# Patient Record
Sex: MALE | Race: WHITE | NOT HISPANIC OR LATINO | Employment: FULL TIME | URBAN - METROPOLITAN AREA
[De-identification: names, ages, dates, MRNs, and addresses within clinical notes are randomized per-mention and may not be internally consistent; named-entity substitution may affect disease eponyms.]

---

## 2024-02-18 DIAGNOSIS — E78.5 HYPERLIPIDEMIA, UNSPECIFIED HYPERLIPIDEMIA TYPE: Primary | ICD-10-CM

## 2024-02-18 RX ORDER — MINERAL OIL
180 ENEMA (ML) RECTAL
COMMUNITY

## 2024-02-18 RX ORDER — EPINEPHRINE 0.3 MG/.3ML
INJECTION SUBCUTANEOUS
COMMUNITY
Start: 2020-07-10

## 2024-02-18 RX ORDER — ROSUVASTATIN CALCIUM 20 MG/1
20 TABLET, COATED ORAL DAILY
Qty: 90 TABLET | Refills: 0 | Status: SHIPPED | OUTPATIENT
Start: 2024-02-18 | End: 2024-05-18

## 2024-02-18 RX ORDER — LORAZEPAM 1 MG/1
TABLET ORAL
COMMUNITY

## 2024-02-18 RX ORDER — OMEPRAZOLE 40 MG/1
40 CAPSULE, DELAYED RELEASE ORAL
COMMUNITY
Start: 2018-06-29

## 2024-02-18 RX ORDER — LOSARTAN POTASSIUM 50 MG/1
50 TABLET ORAL
COMMUNITY
Start: 2023-05-24

## 2024-02-18 RX ORDER — ESCITALOPRAM OXALATE 5 MG/1
5 TABLET ORAL
COMMUNITY
Start: 2023-05-24

## 2024-02-18 RX ORDER — ROSUVASTATIN CALCIUM 20 MG/1
1 TABLET, COATED ORAL NIGHTLY
COMMUNITY
Start: 2023-05-24 | End: 2024-02-18 | Stop reason: SDUPTHER

## 2024-02-18 RX ORDER — HYDROCHLOROTHIAZIDE 25 MG/1
25 TABLET ORAL
COMMUNITY
Start: 2023-05-24

## 2024-08-05 ENCOUNTER — OFFICE VISIT (OUTPATIENT)
Dept: ORTHOPEDIC SURGERY | Facility: HOSPITAL | Age: 54
End: 2024-08-05
Payer: COMMERCIAL

## 2024-08-05 ENCOUNTER — HOSPITAL ENCOUNTER (OUTPATIENT)
Dept: RADIOLOGY | Facility: HOSPITAL | Age: 54
Discharge: HOME | End: 2024-08-05
Payer: COMMERCIAL

## 2024-08-05 DIAGNOSIS — M47.812 CERVICAL SPONDYLOSIS: ICD-10-CM

## 2024-08-05 PROBLEM — Z98.890 HISTORY OF CERVICAL SPINAL SURGERY: Status: ACTIVE | Noted: 2024-08-05

## 2024-08-05 PROBLEM — R55 VASOVAGAL SYNCOPE: Status: ACTIVE | Noted: 2018-04-10

## 2024-08-05 PROBLEM — D22.9 NUMEROUS MOLES: Status: ACTIVE | Noted: 2018-08-08

## 2024-08-05 PROBLEM — T78.40XA ALLERGY: Status: ACTIVE | Noted: 2024-08-05

## 2024-08-05 PROBLEM — E78.5 HYPERLIPIDEMIA: Status: ACTIVE | Noted: 2024-08-05

## 2024-08-05 PROBLEM — M20.019 MALLET FINGER: Status: ACTIVE | Noted: 2024-08-05

## 2024-08-05 PROBLEM — T78.2XXA ANAPHYLAXIS: Status: ACTIVE | Noted: 2020-07-22

## 2024-08-05 PROBLEM — K44.9 HIATAL HERNIA: Status: ACTIVE | Noted: 2018-02-19

## 2024-08-05 PROBLEM — R97.20 ELEVATED PSA: Status: ACTIVE | Noted: 2023-05-24

## 2024-08-05 PROBLEM — F41.9 ANXIETY DISORDER: Status: ACTIVE | Noted: 2024-08-05

## 2024-08-05 PROBLEM — E66.3 OVERWEIGHT: Status: ACTIVE | Noted: 2024-08-05

## 2024-08-05 PROBLEM — R73.01 IMPAIRED FASTING GLUCOSE: Status: ACTIVE | Noted: 2018-02-19

## 2024-08-05 PROBLEM — C61 PROSTATE CANCER (MULTI): Status: ACTIVE | Noted: 2023-06-16

## 2024-08-05 PROBLEM — D12.6 ADENOMATOUS POLYP OF COLON: Status: ACTIVE | Noted: 2018-02-19

## 2024-08-05 PROBLEM — I10 ESSENTIAL HYPERTENSION: Status: ACTIVE | Noted: 2018-02-13

## 2024-08-05 PROBLEM — R20.0 LEFT LEG NUMBNESS: Status: ACTIVE | Noted: 2024-08-05

## 2024-08-05 PROBLEM — H61.20 CERUMEN IMPACTION: Status: ACTIVE | Noted: 2024-08-05

## 2024-08-05 PROBLEM — K22.2 ESOPHAGEAL STRICTURE: Status: ACTIVE | Noted: 2024-08-05

## 2024-08-05 PROBLEM — K21.9 GERD WITHOUT ESOPHAGITIS: Status: ACTIVE | Noted: 2018-02-19

## 2024-08-05 PROBLEM — R13.10 DYSPHAGIA: Status: ACTIVE | Noted: 2024-08-05

## 2024-08-05 PROCEDURE — 72040 X-RAY EXAM NECK SPINE 2-3 VW: CPT

## 2024-08-05 PROCEDURE — 72040 X-RAY EXAM NECK SPINE 2-3 VW: CPT | Performed by: STUDENT IN AN ORGANIZED HEALTH CARE EDUCATION/TRAINING PROGRAM

## 2024-08-05 PROCEDURE — 99204 OFFICE O/P NEW MOD 45 MIN: CPT | Performed by: ORTHOPAEDIC SURGERY

## 2024-08-05 PROCEDURE — 99214 OFFICE O/P EST MOD 30 MIN: CPT | Performed by: ORTHOPAEDIC SURGERY

## 2024-08-05 RX ORDER — GLUCOSAM/CHONDRO/HERB 149/HYAL 750-100 MG
1000 TABLET ORAL
COMMUNITY

## 2024-08-05 RX ORDER — LEVOFLOXACIN 500 MG/1
1 TABLET, FILM COATED ORAL
COMMUNITY
Start: 2024-01-03

## 2024-08-05 NOTE — PROGRESS NOTES
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  13 years ago, he had a two-level anterior cervical discectomy for cervical myelopathy.  He did very well with this over time.    3 days ago, while beginning school but diving training, he developed the abrupt onset of axial neck pain and paresthesias in both arms and legs.  He was swimming in a pool and thought that the symptoms might be muscular in nature.  They persisted when he got out of the pool.    When he extends his head and neck he experiences burning and numbness in all 4 extremities.    When he Valsalva to spit, 2 days ago this prompted a very similar episode of 4 extremity burning and paresthesias.    In April of this year, he fell over a student and landed on the floor and developed transient numbness in his left arm.    Aside from that, he has not had significant issues recently with his cervical spine.    He did have a cervical MRI in 2016 ordered by Dr. Dinesh Barbosa in neurology.  He does recall having some recurrent paresthesias in his extremities at that time but they did resolve.    He has had some chronic intermittent low back pain over the last several months.    His general health is significant for prostate cancer that was diagnosed incidentally with an elevated PSA level.  Surveillance was the plan and actually the PSA level has diminished.    Family, social, and medical histories are obtained and reviewed.    30-point, patient-recorded Review of Systems is personally obtained and reviewed. Inclusive is no history of weight loss, change in appetite, recent change in activity level, change in bowel or bladder habits, fevers, chills, malaise, or night pain.    On exam very pleasant, healthy-appearing man no acute distress.  He does have a somewhat wide-based gait and has difficulty with heel-to-toe tandem walking.  He has limited extension of the cervical spine and this does seem to reproduce symptoms in his  arms.    His strength is intact in both upper extremities.  He has positive Martín's bilaterally.  He has a positive inverted radial reflex bilaterally.    His strength is intact in the lower extremities.  He has very brisk reflexes at the knee and ankle bilaterally.    Plain film today shows his fusion is quite solid.  The adjacent disc space at C4-5 is well-maintained although there are large anterior osteophytes bridging this disc space, compared to plain films 13 years ago.    Impression: He has recently developed paresthesias in all 4 extremities and is exhibiting myelopathic features objectively.  He is ambulatory.    Given the severity of his symptoms, advanced imaging with an MRI with metal artifact reduction is indicated.    For the benefit of a representative of his insurer reviewing this request for advanced imaging, the indications are severe for extremity numbness and paresthesias, aggravated by extension of the cervical spine in an individual with a prior two-level cervical fusion, and an individual who is markedly hyperreflexic and has an abnormal gait.    I will speak with him after his cervical MRI is done.    ** Dictated with voice recognition software and not immediately reviewed for errors in grammar and/or spelling **

## 2024-08-05 NOTE — LETTER
August 5, 2024     Raymond Villanueva MD  [address] [facility] [address]    Patient: Franki Peterson   YOB: 1970   Date of Visit: 8/5/2024       Dear Dr. Raymond Villanueva MD:    Thank you for referring Franki Peterson to me for evaluation. Below are my notes for this consultation.  If you have questions, please do not hesitate to call me. I look forward to following your patient along with you.       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  13 years ago, he had a two-level anterior cervical discectomy for cervical myelopathy.  He did very well with this over time.    3 days ago, while beginning school but diving training, he developed the abrupt onset of axial neck pain and paresthesias in both arms and legs.  He was swimming in a pool and thought that the symptoms might be muscular in nature.  They persisted when he got out of the pool.    When he extends his head and neck he experiences burning and numbness in all 4 extremities.    When he Valsalva to spit, 2 days ago this prompted a very similar episode of 4 extremity burning and paresthesias.    In April of this year, he fell over a student and landed on the floor and developed transient numbness in his left arm.    Aside from that, he has not had significant issues recently with his cervical spine.    He did have a cervical MRI in 2016 ordered by Dr. Dinesh Barbosa in neurology.  He does recall having some recurrent paresthesias in his extremities at that time but they did resolve.    He has had some chronic intermittent low back pain over the last several months.    His general health is significant for prostate cancer that was diagnosed incidentally with an elevated PSA level.  Surveillance was the plan and actually the PSA level has  diminished.    Family, social, and medical histories are obtained and reviewed.    30-point, patient-recorded Review of Systems is personally obtained and reviewed. Inclusive is no history of weight loss, change in appetite, recent change in activity level, change in bowel or bladder habits, fevers, chills, malaise, or night pain.    On exam very pleasant, healthy-appearing man no acute distress.  He does have a somewhat wide-based gait and has difficulty with heel-to-toe tandem walking.  He has limited extension of the cervical spine and this does seem to reproduce symptoms in his arms.    His strength is intact in both upper extremities.  He has positive Martín's bilaterally.  He has a positive inverted radial reflex bilaterally.    His strength is intact in the lower extremities.  He has very brisk reflexes at the knee and ankle bilaterally.    Plain film today shows his fusion is quite solid.  The adjacent disc space at C4-5 is well-maintained although there are large anterior osteophytes bridging this disc space, compared to plain films 13 years ago.    Impression: He has recently developed paresthesias in all 4 extremities and is exhibiting myelopathic features objectively.  He is ambulatory.    Given the severity of his symptoms, advanced imaging with an MRI with metal artifact reduction is indicated.    For the benefit of a representative of his insurer reviewing this request for advanced imaging, the indications are severe for extremity numbness and paresthesias, aggravated by extension of the cervical spine in an individual with a prior two-level cervical fusion, and an individual who is markedly hyperreflexic and has an abnormal gait.    I will speak with him after his cervical MRI is done.    ** Dictated with voice recognition software and not immediately reviewed for errors in grammar and/or spelling **

## 2024-08-06 ENCOUNTER — HOSPITAL ENCOUNTER (OUTPATIENT)
Dept: RADIOLOGY | Facility: CLINIC | Age: 54
Discharge: HOME | End: 2024-08-06
Payer: COMMERCIAL

## 2024-08-06 DIAGNOSIS — M47.12 OTHER SPONDYLOSIS WITH MYELOPATHY, CERVICAL REGION: ICD-10-CM

## 2024-08-06 PROCEDURE — 72141 MRI NECK SPINE W/O DYE: CPT

## 2024-08-06 PROCEDURE — 72141 MRI NECK SPINE W/O DYE: CPT | Performed by: RADIOLOGY

## 2024-08-07 ENCOUNTER — OFFICE VISIT (OUTPATIENT)
Dept: ORTHOPEDIC SURGERY | Facility: CLINIC | Age: 54
End: 2024-08-07
Payer: COMMERCIAL

## 2024-08-07 DIAGNOSIS — M47.12 CERVICAL SPONDYLOSIS WITH MYELOPATHY: Primary | ICD-10-CM

## 2024-08-07 PROCEDURE — 99214 OFFICE O/P EST MOD 30 MIN: CPT | Performed by: ORTHOPAEDIC SURGERY

## 2024-08-07 ASSESSMENT — PAIN - FUNCTIONAL ASSESSMENT: PAIN_FUNCTIONAL_ASSESSMENT: NO/DENIES PAIN

## 2024-08-08 ENCOUNTER — LAB (OUTPATIENT)
Dept: LAB | Facility: LAB | Age: 54
End: 2024-08-08
Payer: COMMERCIAL

## 2024-08-08 DIAGNOSIS — M47.12 OTHER SPONDYLOSIS WITH MYELOPATHY, CERVICAL REGION: ICD-10-CM

## 2024-08-08 LAB
ABO GROUP (TYPE) IN BLOOD: NORMAL
ALBUMIN SERPL BCP-MCNC: 4.9 G/DL (ref 3.4–5)
ALP SERPL-CCNC: 60 U/L (ref 33–120)
ALT SERPL W P-5'-P-CCNC: 23 U/L (ref 10–52)
ANION GAP SERPL CALC-SCNC: 11 MMOL/L (ref 10–20)
ANTIBODY SCREEN: NORMAL
APPEARANCE UR: ABNORMAL
AST SERPL W P-5'-P-CCNC: 16 U/L (ref 9–39)
BILIRUB SERPL-MCNC: 0.9 MG/DL (ref 0–1.2)
BILIRUB UR STRIP.AUTO-MCNC: NEGATIVE MG/DL
BUN SERPL-MCNC: 22 MG/DL (ref 6–23)
CALCIUM SERPL-MCNC: 10.2 MG/DL (ref 8.6–10.6)
CHLORIDE SERPL-SCNC: 101 MMOL/L (ref 98–107)
CO2 SERPL-SCNC: 32 MMOL/L (ref 21–32)
COLOR UR: YELLOW
CREAT SERPL-MCNC: 0.98 MG/DL (ref 0.5–1.3)
EGFRCR SERPLBLD CKD-EPI 2021: >90 ML/MIN/1.73M*2
GLUCOSE SERPL-MCNC: 102 MG/DL (ref 74–99)
GLUCOSE UR STRIP.AUTO-MCNC: NORMAL MG/DL
INR PPP: 1 (ref 0.9–1.1)
KETONES UR STRIP.AUTO-MCNC: NEGATIVE MG/DL
LEUKOCYTE ESTERASE UR QL STRIP.AUTO: NEGATIVE
MUCOUS THREADS #/AREA URNS AUTO: ABNORMAL /LPF
NITRITE UR QL STRIP.AUTO: NEGATIVE
PH UR STRIP.AUTO: 5.5 [PH]
POTASSIUM SERPL-SCNC: 4.2 MMOL/L (ref 3.5–5.3)
PROT SERPL-MCNC: 7.7 G/DL (ref 6.4–8.2)
PROT UR STRIP.AUTO-MCNC: ABNORMAL MG/DL
PROTHROMBIN TIME: 11.3 SECONDS (ref 9.8–12.8)
RBC # UR STRIP.AUTO: ABNORMAL /UL
RBC #/AREA URNS AUTO: >20 /HPF
RH FACTOR (ANTIGEN D): NORMAL
SODIUM SERPL-SCNC: 140 MMOL/L (ref 136–145)
SP GR UR STRIP.AUTO: 1.03
UROBILINOGEN UR STRIP.AUTO-MCNC: NORMAL MG/DL
WBC #/AREA URNS AUTO: ABNORMAL /HPF

## 2024-08-08 PROCEDURE — 86850 RBC ANTIBODY SCREEN: CPT

## 2024-08-08 PROCEDURE — 86901 BLOOD TYPING SEROLOGIC RH(D): CPT

## 2024-08-08 PROCEDURE — 85610 PROTHROMBIN TIME: CPT

## 2024-08-08 PROCEDURE — 86900 BLOOD TYPING SEROLOGIC ABO: CPT

## 2024-08-08 PROCEDURE — 80053 COMPREHEN METABOLIC PANEL: CPT

## 2024-08-08 PROCEDURE — 81001 URINALYSIS AUTO W/SCOPE: CPT

## 2024-08-08 PROCEDURE — 36415 COLL VENOUS BLD VENIPUNCTURE: CPT

## 2024-08-08 NOTE — PROGRESS NOTES
Franki and his wife,  Pat Peterson, return to review his MRI.  I had spoken with him yesterday after his MRI was obtained and reviewed the findings and suggested that they come and today.    As noted, this past weekend, while involved in scuba diving training, he developed spontaneous onset of profound numbness and weakness in both arms and legs.  He was able to get out of the pool.  He thought it might have been muscular in nature.    The symptoms have persisted.  He is ambulatory.  However he notes diffuse numbness in both arms and legs.  When he extends his head he gets electric shocks in both arms.    He has noted some weakness and coordination difficulty in both arms and hands.    On exam today, his gait is somewhat wide-based.  He has great difficulty with heel-to-toe tandem walking.  He has limited extension of the cervical spine with a positive Lhermitte's.  His strength is intact in the upper and lower extremities.  He has markedly positive Martín's bilaterally.  He has brisk reflexes bilaterally at the knee and ankle.    Flexion-extension views obtained at his office visit earlier this week show no excessive motion.  There is a large anterior osteophyte bridging C4-5.    His MRI shows critically severe stenosis and severe spinal cord compression at C3-4 in large part due to a massive disc herniation.    Impression: He has become acutely myelopathic due to severe spinal cord compression at C3-4.  This is 2 levels above his prior multilevel anterior fusion done remotely.    Given the severity of the spinal cord compression along with his severe subjective and objective features clinically, surgery is indicated.    We have discussed the surgical options.  We have discussed the possibility of a anterior procedure with discectomy and fusion at C3-4 and C4-5.  There would be the significant possibility of severe dysphagia associated with this.    As such I would recommend a surgical approach posterior with  a laminectomy C3-4 and fusion from C3 extending into his fusion mass at C5-6.    We have talked about the risks and benefits and expectations of surgery.    Both he and his wife understand and agree.    I would recommend that this surgery is done urgently.  For the benefit of an insurer who is reviewing the need for surgery and the need for an expedited approach, this is based on the severity of his spinal cord compression and the severity of his symptoms clinically as noted above.    ** Dictated with voice recognition software and not immediately reviewed for errors in grammar and/or spelling **

## 2024-08-09 ENCOUNTER — LAB REQUISITION (OUTPATIENT)
Dept: LAB | Facility: HOSPITAL | Age: 54
End: 2024-08-09
Payer: COMMERCIAL

## 2024-08-09 ENCOUNTER — ANESTHESIA EVENT (OUTPATIENT)
Dept: OPERATING ROOM | Facility: HOSPITAL | Age: 54
End: 2024-08-09
Payer: COMMERCIAL

## 2024-08-09 DIAGNOSIS — M47.12 OTHER SPONDYLOSIS WITH MYELOPATHY, CERVICAL REGION: ICD-10-CM

## 2024-08-09 LAB
ABO GROUP (TYPE) IN BLOOD: NORMAL
ANTIBODY SCREEN: NORMAL
RH FACTOR (ANTIGEN D): NORMAL

## 2024-08-12 ENCOUNTER — ANESTHESIA (OUTPATIENT)
Dept: OPERATING ROOM | Facility: HOSPITAL | Age: 54
End: 2024-08-12
Payer: COMMERCIAL

## 2024-08-12 ENCOUNTER — HOSPITAL ENCOUNTER (OUTPATIENT)
Dept: NEUROLOGY | Facility: HOSPITAL | Age: 54
Setting detail: OUTPATIENT SURGERY
Discharge: HOME | End: 2024-08-12
Payer: COMMERCIAL

## 2024-08-12 ENCOUNTER — HOSPITAL ENCOUNTER (INPATIENT)
Facility: HOSPITAL | Age: 54
LOS: 2 days | Discharge: HOME | End: 2024-08-14
Attending: ORTHOPAEDIC SURGERY | Admitting: ORTHOPAEDIC SURGERY
Payer: COMMERCIAL

## 2024-08-12 ENCOUNTER — APPOINTMENT (OUTPATIENT)
Dept: RADIOLOGY | Facility: HOSPITAL | Age: 54
End: 2024-08-12
Payer: COMMERCIAL

## 2024-08-12 DIAGNOSIS — M47.812 CERVICAL SPONDYLOSIS: ICD-10-CM

## 2024-08-12 DIAGNOSIS — M47.12 OTHER SPONDYLOSIS WITH MYELOPATHY, CERVICAL REGION: Primary | ICD-10-CM

## 2024-08-12 PROBLEM — E66.9 OBESITY: Status: ACTIVE | Noted: 2024-08-12

## 2024-08-12 PROCEDURE — 2780000003 HC OR 278 NO HCPCS: Performed by: ORTHOPAEDIC SURGERY

## 2024-08-12 PROCEDURE — A63048 PR LAMINEC/FACETECT/FORAMIN,EACH ADDNL: Performed by: ANESTHESIOLOGY

## 2024-08-12 PROCEDURE — A63048 PR LAMINEC/FACETECT/FORAMIN,EACH ADDNL

## 2024-08-12 PROCEDURE — 95940 IONM IN OPERATNG ROOM 15 MIN: CPT

## 2024-08-12 PROCEDURE — 2500000001 HC RX 250 WO HCPCS SELF ADMINISTERED DRUGS (ALT 637 FOR MEDICARE OP): Performed by: ANESTHESIOLOGY

## 2024-08-12 PROCEDURE — 7100000011 HC EXTENDED STAY RECOVERY HOURLY - NURSING UNIT

## 2024-08-12 PROCEDURE — 4A11X4G MONITORING OF PERIPHERAL NERVOUS ELECTRICAL ACTIVITY, INTRAOPERATIVE, EXTERNAL APPROACH: ICD-10-PCS | Performed by: ORTHOPAEDIC SURGERY

## 2024-08-12 PROCEDURE — 76000 FLUOROSCOPY <1 HR PHYS/QHP: CPT

## 2024-08-12 PROCEDURE — 1210000001 HC SEMI-PRIVATE ROOM DAILY

## 2024-08-12 PROCEDURE — 0RG20K1 FUSION OF 2 OR MORE CERVICAL VERTEBRAL JOINTS WITH NONAUTOLOGOUS TISSUE SUBSTITUTE, POSTERIOR APPROACH, POSTERIOR COLUMN, OPEN APPROACH: ICD-10-PCS | Performed by: ORTHOPAEDIC SURGERY

## 2024-08-12 PROCEDURE — 2500000005 HC RX 250 GENERAL PHARMACY W/O HCPCS

## 2024-08-12 PROCEDURE — 3600000017 HC OR TIME - EACH INCREMENTAL 1 MINUTE - PROCEDURE LEVEL SIX: Performed by: ORTHOPAEDIC SURGERY

## 2024-08-12 PROCEDURE — 3700000002 HC GENERAL ANESTHESIA TIME - EACH INCREMENTAL 1 MINUTE: Performed by: ORTHOPAEDIC SURGERY

## 2024-08-12 PROCEDURE — C1713 ANCHOR/SCREW BN/BN,TIS/BN: HCPCS | Performed by: ORTHOPAEDIC SURGERY

## 2024-08-12 PROCEDURE — 7100000001 HC RECOVERY ROOM TIME - INITIAL BASE CHARGE: Performed by: ORTHOPAEDIC SURGERY

## 2024-08-12 PROCEDURE — 2500000005 HC RX 250 GENERAL PHARMACY W/O HCPCS: Performed by: ANESTHESIOLOGY

## 2024-08-12 PROCEDURE — 2500000004 HC RX 250 GENERAL PHARMACY W/ HCPCS (ALT 636 FOR OP/ED): Mod: JZ | Performed by: ORTHOPAEDIC SURGERY

## 2024-08-12 PROCEDURE — C1762 CONN TISS, HUMAN(INC FASCIA): HCPCS | Performed by: ORTHOPAEDIC SURGERY

## 2024-08-12 PROCEDURE — 63045 LAM FACETEC & FORAMOT CRV: CPT | Performed by: ORTHOPAEDIC SURGERY

## 2024-08-12 PROCEDURE — 2500000001 HC RX 250 WO HCPCS SELF ADMINISTERED DRUGS (ALT 637 FOR MEDICARE OP): Performed by: ORTHOPAEDIC SURGERY

## 2024-08-12 PROCEDURE — 7100000002 HC RECOVERY ROOM TIME - EACH INCREMENTAL 1 MINUTE: Performed by: ORTHOPAEDIC SURGERY

## 2024-08-12 PROCEDURE — 3700000001 HC GENERAL ANESTHESIA TIME - INITIAL BASE CHARGE: Performed by: ORTHOPAEDIC SURGERY

## 2024-08-12 PROCEDURE — 3600000018 HC OR TIME - INITIAL BASE CHARGE - PROCEDURE LEVEL SIX: Performed by: ORTHOPAEDIC SURGERY

## 2024-08-12 PROCEDURE — 2500000004 HC RX 250 GENERAL PHARMACY W/ HCPCS (ALT 636 FOR OP/ED)

## 2024-08-12 PROCEDURE — 9420000001 HC RT PATIENT EDUCATION 5 MIN

## 2024-08-12 PROCEDURE — 00NW0ZZ RELEASE CERVICAL SPINAL CORD, OPEN APPROACH: ICD-10-PCS | Performed by: ORTHOPAEDIC SURGERY

## 2024-08-12 PROCEDURE — 51701 INSERT BLADDER CATHETER: CPT

## 2024-08-12 PROCEDURE — 2720000007 HC OR 272 NO HCPCS: Performed by: ORTHOPAEDIC SURGERY

## 2024-08-12 PROCEDURE — 2500000005 HC RX 250 GENERAL PHARMACY W/O HCPCS: Performed by: ORTHOPAEDIC SURGERY

## 2024-08-12 DEVICE — BONE, CHIP, CANCELLOUS, 1.7-10 MM, 15 CC: Type: IMPLANTABLE DEVICE | Site: SPINE CERVICAL | Status: FUNCTIONAL

## 2024-08-12 DEVICE — IMPLANTABLE DEVICE: Type: IMPLANTABLE DEVICE | Site: SPINE CERVICAL | Status: FUNCTIONAL

## 2024-08-12 DEVICE — BONE, PUTTY DBX  1CC DE-MINERAL: Type: IMPLANTABLE DEVICE | Site: SPINE CERVICAL | Status: FUNCTIONAL

## 2024-08-12 RX ORDER — OXYCODONE HYDROCHLORIDE 5 MG/1
2.5 TABLET ORAL EVERY 4 HOURS PRN
Status: DISCONTINUED | OUTPATIENT
Start: 2024-08-12 | End: 2024-08-14 | Stop reason: HOSPADM

## 2024-08-12 RX ORDER — METHOCARBAMOL 100 MG/ML
INJECTION, SOLUTION INTRAMUSCULAR; INTRAVENOUS AS NEEDED
Status: DISCONTINUED | OUTPATIENT
Start: 2024-08-12 | End: 2024-08-12

## 2024-08-12 RX ORDER — MIDAZOLAM HYDROCHLORIDE 1 MG/ML
INJECTION INTRAMUSCULAR; INTRAVENOUS AS NEEDED
Status: DISCONTINUED | OUTPATIENT
Start: 2024-08-12 | End: 2024-08-12

## 2024-08-12 RX ORDER — ONDANSETRON HYDROCHLORIDE 2 MG/ML
4 INJECTION, SOLUTION INTRAVENOUS ONCE AS NEEDED
Status: DISCONTINUED | OUTPATIENT
Start: 2024-08-12 | End: 2024-08-12 | Stop reason: HOSPADM

## 2024-08-12 RX ORDER — DEXAMETHASONE SODIUM PHOSPHATE 10 MG/ML
10 INJECTION INTRAMUSCULAR; INTRAVENOUS EVERY 8 HOURS SCHEDULED
Status: COMPLETED | OUTPATIENT
Start: 2024-08-12 | End: 2024-08-13

## 2024-08-12 RX ORDER — BACITRACIN 500 [USP'U]/G
OINTMENT TOPICAL AS NEEDED
Status: DISCONTINUED | OUTPATIENT
Start: 2024-08-12 | End: 2024-08-12 | Stop reason: HOSPADM

## 2024-08-12 RX ORDER — ONDANSETRON 4 MG/1
4 TABLET, FILM COATED ORAL EVERY 8 HOURS PRN
Status: DISCONTINUED | OUTPATIENT
Start: 2024-08-12 | End: 2024-08-14 | Stop reason: HOSPADM

## 2024-08-12 RX ORDER — POLYETHYLENE GLYCOL 3350 17 G/17G
17 POWDER, FOR SOLUTION ORAL DAILY
Status: DISCONTINUED | OUTPATIENT
Start: 2024-08-13 | End: 2024-08-14 | Stop reason: HOSPADM

## 2024-08-12 RX ORDER — LIDOCAINE HYDROCHLORIDE 10 MG/ML
0.1 INJECTION, SOLUTION EPIDURAL; INFILTRATION; INTRACAUDAL; PERINEURAL ONCE
Status: DISCONTINUED | OUTPATIENT
Start: 2024-08-12 | End: 2024-08-12 | Stop reason: HOSPADM

## 2024-08-12 RX ORDER — METHOCARBAMOL 500 MG/1
500 TABLET, FILM COATED ORAL EVERY 6 HOURS PRN
Status: DISCONTINUED | OUTPATIENT
Start: 2024-08-12 | End: 2024-08-14 | Stop reason: HOSPADM

## 2024-08-12 RX ORDER — PHENYLEPHRINE HCL IN 0.9% NACL 1 MG/10 ML
SYRINGE (ML) INTRAVENOUS AS NEEDED
Status: DISCONTINUED | OUTPATIENT
Start: 2024-08-12 | End: 2024-08-12

## 2024-08-12 RX ORDER — DEXTROSE 50 % IN WATER (D50W) INTRAVENOUS SYRINGE
12.5
Status: DISCONTINUED | OUTPATIENT
Start: 2024-08-12 | End: 2024-08-14 | Stop reason: HOSPADM

## 2024-08-12 RX ORDER — DIPHENHYDRAMINE HCL 12.5MG/5ML
12.5 LIQUID (ML) ORAL EVERY 6 HOURS PRN
Status: DISCONTINUED | OUTPATIENT
Start: 2024-08-12 | End: 2024-08-14 | Stop reason: HOSPADM

## 2024-08-12 RX ORDER — PANTOPRAZOLE SODIUM 40 MG/1
40 TABLET, DELAYED RELEASE ORAL
Status: DISCONTINUED | OUTPATIENT
Start: 2024-08-13 | End: 2024-08-14 | Stop reason: HOSPADM

## 2024-08-12 RX ORDER — SODIUM CHLORIDE 0.9 G/100ML
IRRIGANT IRRIGATION AS NEEDED
Status: DISCONTINUED | OUTPATIENT
Start: 2024-08-12 | End: 2024-08-12 | Stop reason: HOSPADM

## 2024-08-12 RX ORDER — PROPOFOL 10 MG/ML
INJECTION, EMULSION INTRAVENOUS AS NEEDED
Status: DISCONTINUED | OUTPATIENT
Start: 2024-08-12 | End: 2024-08-12

## 2024-08-12 RX ORDER — ROCURONIUM BROMIDE 10 MG/ML
INJECTION, SOLUTION INTRAVENOUS AS NEEDED
Status: DISCONTINUED | OUTPATIENT
Start: 2024-08-12 | End: 2024-08-12

## 2024-08-12 RX ORDER — FENTANYL CITRATE 50 UG/ML
INJECTION, SOLUTION INTRAMUSCULAR; INTRAVENOUS AS NEEDED
Status: DISCONTINUED | OUTPATIENT
Start: 2024-08-12 | End: 2024-08-12

## 2024-08-12 RX ORDER — SODIUM CHLORIDE, SODIUM LACTATE, POTASSIUM CHLORIDE, CALCIUM CHLORIDE 600; 310; 30; 20 MG/100ML; MG/100ML; MG/100ML; MG/100ML
100 INJECTION, SOLUTION INTRAVENOUS CONTINUOUS
Status: DISCONTINUED | OUTPATIENT
Start: 2024-08-12 | End: 2024-08-12 | Stop reason: HOSPADM

## 2024-08-12 RX ORDER — NALOXONE HYDROCHLORIDE 0.4 MG/ML
0.2 INJECTION, SOLUTION INTRAMUSCULAR; INTRAVENOUS; SUBCUTANEOUS EVERY 5 MIN PRN
Status: DISCONTINUED | OUTPATIENT
Start: 2024-08-12 | End: 2024-08-14 | Stop reason: HOSPADM

## 2024-08-12 RX ORDER — MEPERIDINE HYDROCHLORIDE 25 MG/ML
12.5 INJECTION INTRAMUSCULAR; INTRAVENOUS; SUBCUTANEOUS EVERY 10 MIN PRN
Status: DISCONTINUED | OUTPATIENT
Start: 2024-08-12 | End: 2024-08-12 | Stop reason: HOSPADM

## 2024-08-12 RX ORDER — CEFAZOLIN SODIUM 2 G/100ML
2 INJECTION, SOLUTION INTRAVENOUS EVERY 6 HOURS
Status: COMPLETED | OUTPATIENT
Start: 2024-08-12 | End: 2024-08-13

## 2024-08-12 RX ORDER — OXYCODONE HYDROCHLORIDE 5 MG/1
5 TABLET ORAL EVERY 4 HOURS PRN
Status: DISCONTINUED | OUTPATIENT
Start: 2024-08-12 | End: 2024-08-14 | Stop reason: HOSPADM

## 2024-08-12 RX ORDER — ONDANSETRON HYDROCHLORIDE 2 MG/ML
4 INJECTION, SOLUTION INTRAVENOUS EVERY 8 HOURS PRN
Status: DISCONTINUED | OUTPATIENT
Start: 2024-08-12 | End: 2024-08-14 | Stop reason: HOSPADM

## 2024-08-12 RX ORDER — CEFAZOLIN 1 G/1
INJECTION, POWDER, FOR SOLUTION INTRAVENOUS AS NEEDED
Status: DISCONTINUED | OUTPATIENT
Start: 2024-08-12 | End: 2024-08-12

## 2024-08-12 RX ORDER — OXYCODONE HYDROCHLORIDE 5 MG/1
5 TABLET ORAL EVERY 4 HOURS PRN
Status: DISCONTINUED | OUTPATIENT
Start: 2024-08-12 | End: 2024-08-12 | Stop reason: HOSPADM

## 2024-08-12 RX ORDER — SODIUM CHLORIDE, SODIUM LACTATE, POTASSIUM CHLORIDE, CALCIUM CHLORIDE 600; 310; 30; 20 MG/100ML; MG/100ML; MG/100ML; MG/100ML
INJECTION, SOLUTION INTRAVENOUS CONTINUOUS PRN
Status: DISCONTINUED | OUTPATIENT
Start: 2024-08-12 | End: 2024-08-12

## 2024-08-12 RX ORDER — ESMOLOL HYDROCHLORIDE 10 MG/ML
INJECTION INTRAVENOUS AS NEEDED
Status: DISCONTINUED | OUTPATIENT
Start: 2024-08-12 | End: 2024-08-12

## 2024-08-12 RX ORDER — HYDROMORPHONE HYDROCHLORIDE 1 MG/ML
INJECTION, SOLUTION INTRAMUSCULAR; INTRAVENOUS; SUBCUTANEOUS AS NEEDED
Status: DISCONTINUED | OUTPATIENT
Start: 2024-08-12 | End: 2024-08-12

## 2024-08-12 RX ORDER — DEXTROSE 50 % IN WATER (D50W) INTRAVENOUS SYRINGE
25
Status: DISCONTINUED | OUTPATIENT
Start: 2024-08-12 | End: 2024-08-14 | Stop reason: HOSPADM

## 2024-08-12 RX ORDER — OXYCODONE HYDROCHLORIDE 5 MG/1
10 TABLET ORAL EVERY 4 HOURS PRN
Status: DISCONTINUED | OUTPATIENT
Start: 2024-08-12 | End: 2024-08-14 | Stop reason: HOSPADM

## 2024-08-12 RX ORDER — ONDANSETRON HYDROCHLORIDE 2 MG/ML
INJECTION, SOLUTION INTRAVENOUS AS NEEDED
Status: DISCONTINUED | OUTPATIENT
Start: 2024-08-12 | End: 2024-08-12

## 2024-08-12 RX ORDER — LIDOCAINE HYDROCHLORIDE 20 MG/ML
INJECTION, SOLUTION EPIDURAL; INFILTRATION; INTRACAUDAL; PERINEURAL AS NEEDED
Status: DISCONTINUED | OUTPATIENT
Start: 2024-08-12 | End: 2024-08-12

## 2024-08-12 SDOH — SOCIAL STABILITY: SOCIAL INSECURITY: HAVE YOU HAD ANY THOUGHTS OF HARMING ANYONE ELSE?: NO

## 2024-08-12 SDOH — SOCIAL STABILITY: SOCIAL INSECURITY: ARE THERE ANY APPARENT SIGNS OF INJURIES/BEHAVIORS THAT COULD BE RELATED TO ABUSE/NEGLECT?: NO

## 2024-08-12 SDOH — SOCIAL STABILITY: SOCIAL INSECURITY: DO YOU FEEL UNSAFE GOING BACK TO THE PLACE WHERE YOU ARE LIVING?: NO

## 2024-08-12 SDOH — SOCIAL STABILITY: SOCIAL INSECURITY: WERE YOU ABLE TO COMPLETE ALL THE BEHAVIORAL HEALTH SCREENINGS?: YES

## 2024-08-12 SDOH — SOCIAL STABILITY: SOCIAL INSECURITY: ABUSE: ADULT

## 2024-08-12 SDOH — SOCIAL STABILITY: SOCIAL INSECURITY: DOES ANYONE TRY TO KEEP YOU FROM HAVING/CONTACTING OTHER FRIENDS OR DOING THINGS OUTSIDE YOUR HOME?: NO

## 2024-08-12 SDOH — SOCIAL STABILITY: SOCIAL INSECURITY: HAS ANYONE EVER THREATENED TO HURT YOUR FAMILY OR YOUR PETS?: NO

## 2024-08-12 SDOH — SOCIAL STABILITY: SOCIAL INSECURITY: ARE YOU OR HAVE YOU BEEN THREATENED OR ABUSED PHYSICALLY, EMOTIONALLY, OR SEXUALLY BY ANYONE?: NO

## 2024-08-12 SDOH — SOCIAL STABILITY: SOCIAL INSECURITY: HAVE YOU HAD THOUGHTS OF HARMING ANYONE ELSE?: NO

## 2024-08-12 SDOH — SOCIAL STABILITY: SOCIAL INSECURITY: DO YOU FEEL ANYONE HAS EXPLOITED OR TAKEN ADVANTAGE OF YOU FINANCIALLY OR OF YOUR PERSONAL PROPERTY?: NO

## 2024-08-12 ASSESSMENT — PAIN SCALES - GENERAL
PAINLEVEL_OUTOF10: 3
PAINLEVEL_OUTOF10: 4
PAINLEVEL_OUTOF10: 0 - NO PAIN
PAINLEVEL_OUTOF10: 4
PAIN_LEVEL: 0
PAINLEVEL_OUTOF10: 5 - MODERATE PAIN
PAINLEVEL_OUTOF10: 3
PAINLEVEL_OUTOF10: 2
PAINLEVEL_OUTOF10: 8
PAINLEVEL_OUTOF10: 3
PAINLEVEL_OUTOF10: 2
PAINLEVEL_OUTOF10: 3
PAINLEVEL_OUTOF10: 0 - NO PAIN

## 2024-08-12 ASSESSMENT — LIFESTYLE VARIABLES
AUDIT-C TOTAL SCORE: 0
HOW OFTEN DO YOU HAVE 6 OR MORE DRINKS ON ONE OCCASION: NEVER
AUDIT-C TOTAL SCORE: 0
SKIP TO QUESTIONS 9-10: 1
HOW MANY STANDARD DRINKS CONTAINING ALCOHOL DO YOU HAVE ON A TYPICAL DAY: PATIENT DOES NOT DRINK
HOW OFTEN DO YOU HAVE A DRINK CONTAINING ALCOHOL: NEVER

## 2024-08-12 ASSESSMENT — COLUMBIA-SUICIDE SEVERITY RATING SCALE - C-SSRS
2. HAVE YOU ACTUALLY HAD ANY THOUGHTS OF KILLING YOURSELF?: NO
1. IN THE PAST MONTH, HAVE YOU WISHED YOU WERE DEAD OR WISHED YOU COULD GO TO SLEEP AND NOT WAKE UP?: NO
6. HAVE YOU EVER DONE ANYTHING, STARTED TO DO ANYTHING, OR PREPARED TO DO ANYTHING TO END YOUR LIFE?: NO

## 2024-08-12 ASSESSMENT — PAIN - FUNCTIONAL ASSESSMENT
PAIN_FUNCTIONAL_ASSESSMENT: 0-10

## 2024-08-12 ASSESSMENT — ACTIVITIES OF DAILY LIVING (ADL)
LACK_OF_TRANSPORTATION: NO
WALKS IN HOME: NEEDS ASSISTANCE
ADEQUATE_TO_COMPLETE_ADL: YES
BATHING: INDEPENDENT
HEARING - LEFT EAR: FUNCTIONAL
JUDGMENT_ADEQUATE_SAFELY_COMPLETE_DAILY_ACTIVITIES: YES
HEARING - RIGHT EAR: FUNCTIONAL
TOILETING: INDEPENDENT
DRESSING YOURSELF: INDEPENDENT
ASSISTIVE_DEVICE: WALKER
PATIENT'S MEMORY ADEQUATE TO SAFELY COMPLETE DAILY ACTIVITIES?: YES
FEEDING YOURSELF: INDEPENDENT
GROOMING: INDEPENDENT

## 2024-08-12 ASSESSMENT — COGNITIVE AND FUNCTIONAL STATUS - GENERAL
MOBILITY SCORE: 22
DRESSING REGULAR LOWER BODY CLOTHING: A LITTLE
DAILY ACTIVITIY SCORE: 22
PATIENT BASELINE BEDBOUND: NO
CLIMB 3 TO 5 STEPS WITH RAILING: A LITTLE
DRESSING REGULAR UPPER BODY CLOTHING: A LITTLE
WALKING IN HOSPITAL ROOM: A LITTLE

## 2024-08-12 ASSESSMENT — PAIN DESCRIPTION - DESCRIPTORS
DESCRIPTORS: PRESSURE
DESCRIPTORS: SHOOTING
DESCRIPTORS: PRESSURE

## 2024-08-12 ASSESSMENT — PATIENT HEALTH QUESTIONNAIRE - PHQ9
SUM OF ALL RESPONSES TO PHQ9 QUESTIONS 1 & 2: 0
1. LITTLE INTEREST OR PLEASURE IN DOING THINGS: NOT AT ALL
2. FEELING DOWN, DEPRESSED OR HOPELESS: NOT AT ALL

## 2024-08-12 NOTE — PERIOPERATIVE NURSING NOTE
1642 Patient arrived to [facility] after procedure with Anesthesia and procedure RN, procedure discussed, plan reviewed, VSS    1648 family updated via secure text    1700 pt tolerating ginger ale and minh crackers, pt declining pain meds at this time    1739 pt medicated per order for pain    1801 ext care    1810 family atbedside    1815 dinner try ordered    Patient name & assigned room # Franki Peterson   Procedure: C3-4 cervical Spine posterior laminectomy and fusion, C3-6 fusion w lateral mass fixation,   Anesthesia type (i.e. general, regional, MAC): general LMA  Estimated blood loss (EBL) in OR:25  Relevant PMH: depression anxiety HTN HLD GERD prostate CA  Orientation/mental status: A&Ox3  Incision site(s)/location, surgical dressing(s), and drain type/location: adaptic, bactitracin, fluff, foam tape  Fluids given in OR/PACU, IV site(s), and drips/fluids currently infusin OR  PO status (last oral intake): tolerating PO in PACU, dinner tray ordered  Last set of vital signs & O2 requirements: VSS, oxygen 2l/min NC  Current pain level & last pain/nausea medication dose/time given:10, oxy 5mg @ 1740  Next antibiotic due @    Last void/[place] in place: void by   SCDs on (yes/no):yes  Mode of transport (cart or bed):cart  Belongings sent with patient:yes  Emergency contact (name, relationship, phone number): Pat wife   handoff to CHAN Nelson RN

## 2024-08-12 NOTE — OP NOTE
C3-C4 Cervical Spine Posterior Laminectomy and Fusion; C3-C5 Fusion with Lateral Mass Fixation; Application Livingston Head Hayes; Neuro Monitoring Operative Note     Date: 2024  OR Location: [facility] OR    Name: Franki Peterson, : 1970, Age: 54 y.o., MRN: [id number], Sex: male    Diagnosis  Pre-op Diagnosis      * Other spondylosis with myelopathy, cervical region [M47.12] Post-op Diagnosis     * Other spondylosis with myelopathy, cervical region [M47.12]     Procedures  Application Livingston head hayes    C3-4 laminectomy with spinal cord decompression    C3-5 posterior fusion with lateral mass fixation    Spinal cord monitoring      Surgeons      * Sunil Hamm - Primary    Resident/Fellow/Other Assistant:  Renny Brennan MD, Spine Fellow    Procedure Summary  Anesthesia: General  ASA: III  Anesthesia Staff: Anesthesiologist: Niraj Plummer MD  C-AA: IRVING Ya  Estimated Blood Loss: 5 mL  Intra-op Medications:   Administrations occurring from 1300 to 1600 on 24:   Medication Name Total Dose   thrombin (recombinant) (Recothrom) topical solution 10,000 Units   gelatin absorbable (Gelfoam) 100 sponge 1 each   sodium chloride 0.9 % irrigation solution 1,000 mL              Anesthesia Record               Intraprocedure I/O Totals          Intake    lactated Ringer's 1000.00 mL    Total Intake 1000 mL       Output    Est. Blood Loss 25 mL    Total Output 25 mL       Net    Net Volume 975 mL          Specimen: No specimens collected     Staff:   Circulator: Kenzie  Scrub Person: Yohana Orta Circulator: Kenzie           Closed/Suction Drain 1 Posterior Neck Accordion 15 Fr. (Active)   Dressing Status Clean;Dry 24 164   Drainage Appearance Serosanguineous 24 164   Status To bulb suction 24 164       Tourniquet Times:         Implants:  Implants       Type Name Action Serial No.      Graft BONE, CHIP, CANCELLOUS, 1.7-10 MM, 15 CC - [id number] - [id number]  Implanted [id number]     Graft BONE, PUTTY DBX  1CC DE-MINERAL - [id number] - [id number] Implanted [id number]     Graft BONE, PUTTY DBX  1CC DE-MINERAL - [id number] - [id number] Implanted [id number]     Screw SCREW, INFINITY, MULTI AXIAL, 4.0 X 14 - [id number] Implanted      Screw INFINITY Set Screw Implanted      Jose Infinity 40mm Jose Implanted      Jose Infinity 50mm Jose Implanted               Clinical note: This man has developed the abrupt onset of severe myelopathy.  He has critically severe spinal cord compression at C3-4.  He has had a remote anterior cervical decompression and fusion C5-7 that he did quite well with.    Given the severity of his symptoms and clinical presentation and the degree of spinal cord compression, surgery in the form of a posterior cervical decompression and fusion is recommended.  The risks benefits expectations limitations alternatives and potential complications are discussed.  He understands and wishes to proceed.    He was brought to the operating room, huddle was held, he was identified, antibiotics administered, sequential compression devices placed.  A general anesthetic was secured.  Baseline somatosensory evoked potentials were obtained.  A Livingston head alfaro was placed.  He was carefully turned into the prone position on the Harlan frame with all body prominences well-padded.  The Livingston head alfaro was secured to the Harlan frame with his head and neck in a neutral position.  There were no changes in the evoked potentials during the positioning.  The posterior aspect of his neck was prepped and draped in a sterile fashion.  Midline approach was made and carried sharply through skin and subcutaneous tissue.  A subperiosteal dissection was carried out to the lateral masses.  C5 and 6 were clearly autofused from the prior anterior fusion.  Under fluoroscopic guidance we placed lateral mass screws at C3-4 and 5 with excellent  purchase.  The usual sequence of drilling and entry point with a high-speed bur use of a drill and tap were employed.  4.0 mm x 14 mm screws were placed on either side from the vertex tray.  Rods were secured to the screws and tightened.  A decompression was performed.  Laminectomies were performed at C3 and 4.  A high-speed bur was used to create a trough at the junction of the lateral mass and the lamina.  This was carried down to the anterior cortex and this was completed with a micro Kerrison rongeur.  The lamina were lifted as a unit decompressing the spinal cord.  The spinal cord was well decompressed.  The local bone was cleaned of all soft tissue and morselized with allograft chips and a bone mill.  The wound was copiously irrigated.  The lateral masses were decorticated.  The bone graft mixture along with demineralized bone matrix was placed liberally on either side.  Meticulous hemostasis was obtained.  A Hemovac drain was placed deep to the fascia.  The deep fascia was closed with interrupted #1 Vicryl's, the subcutaneous tissue with interrupted 2-0 Vicryl and the skin with interrupted nylon sutures.  A dry sterile dressing and the patient's collar were placed.  He was carefully placed in the supine position on his hospital bed, awakened and extubated and transferred to the recovery room in stable condition.  There were no changes in the evoked potentials throughout the case.    ** Dictated with voice recognition software and not immediately reviewed for errors in grammar and/or spelling **    Attending Attestation: I was present and scrubbed for the entire procedure.    Sunil Hamm  Phone Number: [phone]

## 2024-08-12 NOTE — ANESTHESIA PROCEDURE NOTES
Peripheral IV  Date/Time: 8/12/2024 1:54 PM      Placement  Needle size: 18 G  Laterality: left  Location: leg  Local anesthetic: none  Site prep: alcohol  Technique: anatomical landmarks  Attempts: 1

## 2024-08-12 NOTE — ANESTHESIA POSTPROCEDURE EVALUATION
Patient: Franki Peterson    Procedure Summary       Date: 08/12/24 Room / Location: [facility] OR 09 / [facility] OR    Anesthesia Start: 1342 Anesthesia Stop: 1644    Procedure: C3-C4 Cervical Spine Posterior Laminectomy and Fusion; C3-C5 Fusion with Lateral Mass Fixation; Application Livingston Head Hayes; Neuro Monitoring (Spine Cervical) Diagnosis:       Other spondylosis with myelopathy, cervical region      (Other spondylosis with myelopathy, cervical region [M47.12])    Surgeons: Sunil Hamm MD Responsible Provider: Niraj Plummer MD    Anesthesia Type: general ASA Status: 3            Anesthesia Type: general    Vitals Value Taken Time   /72 08/12/24 1647   Temp 36.6 °C (97.9 °F) 08/12/24 1642   Pulse 97 08/12/24 1656   Resp 16 08/12/24 1656   SpO2 97 % 08/12/24 1656   Vitals shown include unfiled device data.    Anesthesia Post Evaluation    Patient location during evaluation: bedside  Patient participation: complete - patient cannot participate  Level of consciousness: awake  Pain score: 0  Pain management: adequate  Airway patency: patent  Cardiovascular status: acceptable  Respiratory status: acceptable  Hydration status: acceptable  Postoperative Nausea and Vomiting: none        No notable events documented.

## 2024-08-12 NOTE — BRIEF OP NOTE
Date: 2024  OR Location: [facility] OR    Name: Franki Peterson, : 1970, Age: 54 y.o., MRN: [id number], Sex: male    Diagnosis  Pre-op Diagnosis      * Other spondylosis with myelopathy, cervical region [M47.12] Post-op Diagnosis     * Other spondylosis with myelopathy, cervical region [M47.12]     Procedures  Posterior C3-4 Decompression and Spinal Decompression  Posterior C3-5 Fusion  Surgeons      * Sunil Hamm - Primary    Resident/Fellow/Other Assistant:  Surgeons and Role:  * No surgeons found with a matching role *    Procedure Summary  Anesthesia: General  ASA: III  Anesthesia Staff: Anesthesiologist: Niraj Plummer MD  C-AA: IRVING Ya  Estimated Blood Loss: 5 mL  Intra-op Medications:   Administrations occurring from 1300 to 1600 on 24:   Medication Name Total Dose   thrombin (recombinant) (Recothrom) topical solution 10,000 Units   gelatin absorbable (Gelfoam) 100 sponge 1 each   sodium chloride 0.9 % irrigation solution 1,000 mL              Anesthesia Record               Intraprocedure I/O Totals          Intake    lactated Ringer's 1000.00 mL    Total Intake 1000 mL       Output    Est. Blood Loss 25 mL    Total Output 25 mL       Net    Net Volume 975 mL          Specimen: No specimens collected     Staff:   Circulator: Kenzie  Scrub Person: Yohana Orta Circulator: Kenzie          Findings:      Attending Attestation: I was present and scrubbed for the entire procedure.    Sunil Hamm  Phone Number: [phone]

## 2024-08-12 NOTE — ANESTHESIA PROCEDURE NOTES
Airway  Date/Time: 8/12/2024 1:53 PM  Urgency: elective    Airway not difficult    Staffing  Performed: IRVING   Authorized by: Niraj Plummer MD    Performed by: IRVING Ya  Patient location during procedure: OR    Indications and Patient Condition  Indications for airway management: anesthesia  Spontaneous Ventilation: absent  Sedation level: deep  Preoxygenated: yes  Patient position: sniffing  Mask difficulty assessment: 1 - vent by mask  No planned trial extubation    Final Airway Details  Final airway type: endotracheal airway      Successful airway: ETT  Cuffed: yes   Successful intubation technique: video laryngoscopy  Facilitating devices/methods: intubating stylet  Blade size: #4  ETT size (mm): 7.5  Cormack-Lehane Classification: grade I - full view of glottis  Placement verified by: chest auscultation and capnometry   Cuff volume (mL): 7  Measured from: lips  ETT to lips (cm): 22  Number of attempts at approach: 1    Additional Comments  Glidescope S4 used PSR

## 2024-08-12 NOTE — H&P
History Of Present Illness  Franki Peterson is a 54 y.o. male presenting with cervical myelopathy.  Plan posterior cervical decompression and fusion.     Past Medical History  He has a past medical history of Anxiety, GERD (gastroesophageal reflux disease), Hyperlipidemia, Hypertension, Impaired fasting glucose (10/15/2015), and Prostate cancer (Multi).    Surgical History  He has a past surgical history that includes Other surgical history (10/01/2014); Cervical discectomy (10/01/2014); Other surgical history (10/17/2014); Colonoscopy; Upper gastrointestinal endoscopy; and Wrist surgery (Left).     Social History  He reports that he does not have a smoking history on file. He has quit using smokeless tobacco.  His smokeless tobacco use included chew. He reports that he does not drink alcohol and does not use drugs.    Family History  No family history on file.     Allergies  Lisinopril and Penicillins    Review of Systems     Physical Exam     Last Recorded Vitals  Blood pressure 145/84, pulse 88, temperature 36.4 °C (97.5 °F), temperature source Temporal, resp. rate 18, height 1.829 m (6'), weight 95.5 kg (210 lb 8.6 oz), SpO2 97%.    Relevant Results      Scheduled medications    Continuous medications    PRN medications    No results found for this or any previous visit (from the past 24 hour(s)).    Assessment/Plan   Principal Problem:    Other spondylosis with myelopathy, cervical region  Active Problems:    Obesity              I spent   minutes in the professional and overall care of this patient.      Sunil Hamm MD

## 2024-08-12 NOTE — ANESTHESIA PREPROCEDURE EVALUATION
Patient: Franki Peterson    Procedure Information       Date/Time: 08/12/24 1300    Procedure: C3-C4 Cervical Spine Posterior Laminectomy and Fusion; C3-C6 Fusion with Lateral Mass Fixation; Application Livingston Head Hayes; Surgical Navigation (Spine Cervical) - CPT Code: 22614 x 2; *1 PM Surgery start time requested*    Location: [facility] OR 09 / [facility] OR    Surgeons: Sunil Hamm MD            Relevant Problems   Anesthesia (within normal limits)      Cardiac   (+) Essential hypertension   (+) Hyperlipidemia      Pulmonary  Env allergies      Neuro   (+) Anxiety disorder      GI   (+) Dysphagia   (+) Esophageal stricture   (+) GERD without esophagitis   (+) Hiatal hernia      /Renal   (+) Prostate cancer (Multi)      Liver (within normal limits)      Endocrine   (+) Obesity      Hematology (within normal limits)      Musculoskeletal   (+) Cervical spondylosis   (+) Other spondylosis with myelopathy, cervical region       Clinical information reviewed:    Allergies                NPO Detail:  NPO/Void Status  Date of Last Liquid: 08/12/24  Time of Last Liquid: 0900  Date of Last Solid: 08/11/24  Time of Last Solid: 2300  Last Intake Type: Clear fluids         Physical Exam    Airway  Mallampati: I     Cardiovascular    Dental    Pulmonary    Abdominal            Anesthesia Plan    History of general anesthesia?: yes  History of complications of general anesthesia?: no    ASA 3     general     intravenous induction   Anesthetic plan and risks discussed with patient.

## 2024-08-13 PROCEDURE — 97116 GAIT TRAINING THERAPY: CPT | Mod: GP

## 2024-08-13 PROCEDURE — 97535 SELF CARE MNGMENT TRAINING: CPT | Mod: GO | Performed by: OCCUPATIONAL THERAPIST

## 2024-08-13 PROCEDURE — 97165 OT EVAL LOW COMPLEX 30 MIN: CPT | Mod: GO | Performed by: OCCUPATIONAL THERAPIST

## 2024-08-13 PROCEDURE — 2500000004 HC RX 250 GENERAL PHARMACY W/ HCPCS (ALT 636 FOR OP/ED): Performed by: ORTHOPAEDIC SURGERY

## 2024-08-13 PROCEDURE — 97161 PT EVAL LOW COMPLEX 20 MIN: CPT | Mod: GP

## 2024-08-13 PROCEDURE — 2500000001 HC RX 250 WO HCPCS SELF ADMINISTERED DRUGS (ALT 637 FOR MEDICARE OP): Performed by: ORTHOPAEDIC SURGERY

## 2024-08-13 PROCEDURE — 1210000001 HC SEMI-PRIVATE ROOM DAILY

## 2024-08-13 PROCEDURE — 9420000001 HC RT PATIENT EDUCATION 5 MIN

## 2024-08-13 SDOH — ECONOMIC STABILITY: INCOME INSECURITY: HOW HARD IS IT FOR YOU TO PAY FOR THE VERY BASICS LIKE FOOD, HOUSING, MEDICAL CARE, AND HEATING?: NOT HARD AT ALL

## 2024-08-13 SDOH — ECONOMIC STABILITY: INCOME INSECURITY: IN THE PAST 12 MONTHS, HAS THE ELECTRIC, GAS, OIL, OR WATER COMPANY THREATENED TO SHUT OFF SERVICE IN YOUR HOME?: NO

## 2024-08-13 SDOH — ECONOMIC STABILITY: FOOD INSECURITY: WITHIN THE PAST 12 MONTHS, YOU WORRIED THAT YOUR FOOD WOULD RUN OUT BEFORE YOU GOT MONEY TO BUY MORE.: NEVER TRUE

## 2024-08-13 SDOH — ECONOMIC STABILITY: INCOME INSECURITY: IN THE LAST 12 MONTHS, WAS THERE A TIME WHEN YOU WERE NOT ABLE TO PAY THE MORTGAGE OR RENT ON TIME?: NO

## 2024-08-13 SDOH — ECONOMIC STABILITY: HOUSING INSECURITY: AT ANY TIME IN THE PAST 12 MONTHS, WERE YOU HOMELESS OR LIVING IN A SHELTER (INCLUDING NOW)?: NO

## 2024-08-13 SDOH — SOCIAL STABILITY: SOCIAL NETWORK: ARE YOU MARRIED, WIDOWED, DIVORCED, SEPARATED, NEVER MARRIED, OR LIVING WITH A PARTNER?: MARRIED

## 2024-08-13 SDOH — HEALTH STABILITY: MENTAL HEALTH: HOW OFTEN DO YOU HAVE 6 OR MORE DRINKS ON ONE OCCASION?: NEVER

## 2024-08-13 SDOH — ECONOMIC STABILITY: FOOD INSECURITY: WITHIN THE PAST 12 MONTHS, THE FOOD YOU BOUGHT JUST DIDN'T LAST AND YOU DIDN'T HAVE MONEY TO GET MORE.: NEVER TRUE

## 2024-08-13 SDOH — HEALTH STABILITY: MENTAL HEALTH: HOW OFTEN DO YOU HAVE A DRINK CONTAINING ALCOHOL?: NEVER

## 2024-08-13 SDOH — ECONOMIC STABILITY: TRANSPORTATION INSECURITY
IN THE PAST 12 MONTHS, HAS THE LACK OF TRANSPORTATION KEPT YOU FROM MEDICAL APPOINTMENTS OR FROM GETTING MEDICATIONS?: NO

## 2024-08-13 SDOH — ECONOMIC STABILITY: TRANSPORTATION INSECURITY
IN THE PAST 12 MONTHS, HAS LACK OF TRANSPORTATION KEPT YOU FROM MEETINGS, WORK, OR FROM GETTING THINGS NEEDED FOR DAILY LIVING?: NO

## 2024-08-13 SDOH — ECONOMIC STABILITY: HOUSING INSECURITY: IN THE PAST 12 MONTHS, HOW MANY TIMES HAVE YOU MOVED WHERE YOU WERE LIVING?: 0

## 2024-08-13 SDOH — HEALTH STABILITY: MENTAL HEALTH: HOW MANY STANDARD DRINKS CONTAINING ALCOHOL DO YOU HAVE ON A TYPICAL DAY?: PATIENT DOES NOT DRINK

## 2024-08-13 ASSESSMENT — COGNITIVE AND FUNCTIONAL STATUS - GENERAL
MOBILITY SCORE: 24
MOBILITY SCORE: 24
DAILY ACTIVITIY SCORE: 24

## 2024-08-13 ASSESSMENT — PAIN SCALES - GENERAL
PAINLEVEL_OUTOF10: 0 - NO PAIN
PAINLEVEL_OUTOF10: 4
PAINLEVEL_OUTOF10: 5 - MODERATE PAIN
PAINLEVEL_OUTOF10: 4
PAINLEVEL_OUTOF10: 5 - MODERATE PAIN
PAINLEVEL_OUTOF10: 4

## 2024-08-13 ASSESSMENT — PAIN - FUNCTIONAL ASSESSMENT
PAIN_FUNCTIONAL_ASSESSMENT: 0-10

## 2024-08-13 ASSESSMENT — ACTIVITIES OF DAILY LIVING (ADL)
ADL_ASSISTANCE: INDEPENDENT
HOME_MANAGEMENT_TIME_ENTRY: 12
ADL_ASSISTANCE: INDEPENDENT

## 2024-08-13 ASSESSMENT — LIFESTYLE VARIABLES
AUDIT-C TOTAL SCORE: 0
SKIP TO QUESTIONS 9-10: 1

## 2024-08-13 ASSESSMENT — PAIN SCALES - PAIN ASSESSMENT IN ADVANCED DEMENTIA (PAINAD): TOTALSCORE: MEDICATION (SEE MAR)

## 2024-08-13 ASSESSMENT — PAIN DESCRIPTION - ORIENTATION
ORIENTATION: LEFT;RIGHT
ORIENTATION: RIGHT;LEFT

## 2024-08-13 ASSESSMENT — PAIN DESCRIPTION - LOCATION
LOCATION: SHOULDER
LOCATION: SHOULDER

## 2024-08-13 NOTE — CARE PLAN
Problem: Pain - Adult  Goal: Verbalizes/displays adequate comfort level or baseline comfort level  8/12/2024 2205 by Graciela Mcmullen RN  Outcome: Progressing  8/12/2024 2205 by Graciela Mcmullen RN  Outcome: Progressing     Problem: Safety - Adult  Goal: Free from fall injury  8/12/2024 2205 by Graciela Mcmullen RN  Outcome: Progressing  8/12/2024 2205 by Graciela Mcmullen RN  Outcome: Progressing     Problem: Discharge Planning  Goal: Discharge to home or other facility with appropriate resources  8/12/2024 2205 by Graciela Mcmullen RN  Outcome: Progressing  8/12/2024 2205 by Graciela Mcmullen RN  Outcome: Progressing     Problem: Chronic Conditions and Co-morbidities  Goal: Patient's chronic conditions and co-morbidity symptoms are monitored and maintained or improved  8/12/2024 2205 by Graciela Mcmullen RN  Outcome: Progressing  8/12/2024 2205 by Graciela Mcmullen RN  Outcome: Progressing

## 2024-08-13 NOTE — PROGRESS NOTES
08/13/24 0925   Discharge Planning   Living Arrangements Spouse/significant other   Support Systems Spouse/significant other   Assistance Needed none   Type of Residence Private residence   Number of Stairs to Enter Residence 2   Number of Stairs Within Residence 12   Do you have animals or pets at home? No   Who is requesting discharge planning? Provider   Home or Post Acute Services None   Expected Discharge Disposition Home   Does the patient need discharge transport arranged? No   Financial Resource Strain   How hard is it for you to pay for the very basics like food, housing, medical care, and heating? Not hard   Housing Stability   In the last 12 months, was there a time when you were not able to pay the mortgage or rent on time? N   In the past 12 months, how many times have you moved where you were living? 0   At any time in the past 12 months, were you homeless or living in a shelter (including now)? N   Transportation Needs   In the past 12 months, has lack of transportation kept you from medical appointments or from getting medications? no   In the past 12 months, has lack of transportation kept you from meetings, work, or from getting things needed for daily living? No     8/13/24 0925  Met with patient and brother at bedside to discuss discharge planning.  Patient lives at home with his wife in a house.  He is independent with ADLs and drives at baseline.  He does not use any assistive devices for ambulation.  He plans on returning home at discharge.  Explained to patient that Dr Hamm does not want patient doing any therapy initially.  Explained that he will just need to be walking initially and therapy will be added later down the road.  Patient verbalizes understanding.  Patient does not anticipate any discharge needs.  He will notify the [facility] should any needs arise.  Keiko Gale RN [facility]

## 2024-08-13 NOTE — PERIOPERATIVE NURSING NOTE
Ambulated patient to restroom and back with no assistive devices - gait steady. Patient unable to void. Bladder scanned for 700 ml. Order for straight cath obtained.   Hospitalist Hospitalist Hospitalist Internal Medicine Internal Medicine Internal Medicine Hospitalist Hospitalist Hospitalist Hospitalist Hospitalist Internal Medicine Internal Medicine Internal Medicine Intervent Radiology Nephrology Nephrology Nephrology Nephrology Hospitalist Internal Medicine Nephrology Nephrology Nephrology Nephrology Internal Medicine Internal Medicine Internal Medicine

## 2024-08-13 NOTE — NURSING NOTE
Interdisciplinary team present: NP, PT, NM, CC, SW, Orthopedic Coordinator, and bedside RN.  Pain - controlled  Nausea - none  Discharge barrier - no PT eval yet, has hemovac  Discharge plan - Home   Discharge date/time - pending

## 2024-08-13 NOTE — PROGRESS NOTES
Occupational Therapy    Evaluation/Treatment + DC    Patient Name: Franki Peterson  MRN: [id number]  : 1970  Today's Date: 24  Time Calculation  Start Time: 1107  Stop Time: 1139  Time Calculation (min): 32 min       Assessment:  OT Assessment: Pt presents to OT this date s/p cervical spinal surgery and appears near baseline level of functioning for ADLs and functional transfers/mobility. Pt receptive to all education on cervical spinal precautions and demos good carryover. Pt does not demo or verbalize need for acute or post acute skilled OT services. Will D/C pt from OT caseload.  Prognosis: Good  Evaluation/Treatment Tolerance: Patient tolerated treatment well  Medical Staff Made Aware: Yes  End of Session Communication: Bedside nurse  End of Session Patient Position: Up in chair, Alarm off, not on at start of session  Prognosis: Good  Evaluation/Treatment Tolerance: Patient tolerated treatment well  Medical Staff Made Aware: Yes  Plan:  No Skilled OT: No acute OT goals identified  OT Frequency: OT eval only  OT Discharge Recommendations: No further acute OT, No OT needed after discharge  Equipment Recommended upon Discharge:  (reacher)  OT Recommended Transfer Status: Independent  OT - OK to Discharge: Yes       Subjective     General:   OT Received On: 24  General  Reason for Referral: Pt is a 55 yo male presenting POD #1 s/p posterior C3-4 lami and decompression as well as C3-5 fusion.  Referred By: Dr. Hamm  Past Medical History Relevant to Rehab:   Past Medical History:   Diagnosis Date    Anxiety     GERD (gastroesophageal reflux disease)     Hyperlipidemia     Hypertension     Impaired fasting glucose 10/15/2015    Impaired fasting glucose    Prostate cancer (Multi)       Family/Caregiver Present: Yes  Caregiver Feedback: pt's sister present for part of session  Prior to Session Communication: Bedside nurse  Patient Position Received: Up in chair, Alarm off, not on at start of  "session  General Comment: Pt seated in chair upon arrival and agreeable to OT eval/tx. Pt fully participatory.  Precautions:  Medical Precautions: Fall precautions  Post-Surgical Precautions: Spinal precautions (cervical)  Braces Applied: Soft C-Collar (at all times, remove for hygiene/bathing)  Precautions Comment: hemovac drain       Pain:  Pain Assessment  Pain Assessment: 0-10  0-10 (Numeric) Pain Score: 0 - No pain    Objective   Cognition:  Overall Cognitive Status: Within Functional Limits  Orientation Level: Oriented X4  Insight: Within function limits  Impulsive: Within functional limits           Home Living:  Type of Home: House  Lives With: Spouse (adult son (goes to college in 1 week))  Home Adaptive Equipment: None  Home Layout: Two level, Stairs to alternate level with rails (does not need to access basement)  Alternate Level Stairs-Rails: Left  Alternate Level Stairs-Number of Steps: 14  Home Access: Stairs to enter without rails  Entrance Stairs-Rails: None  Entrance Stairs-Number of Steps: 2  Bathroom Shower/Tub:  (shower on 2nd floor, 1/2 bathroom first floor)  Bathroom Toilet: Standard  Bathroom Equipment: None  Prior Function:  Level of [place]: Independent with ADLs and functional transfers, Independent with homemaking with ambulation  Receives Help From:  (family available to assist PRN)  ADL Assistance: Independent  Homemaking Assistance: Independent (shares with spouse, pt does most cooking and laundry, splits cleaning with spouse)  Ambulatory Assistance: Independent (no AD)  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 Pt denies falls       ADL:  Grooming Deficit:  (pt educated on use of cup to rinse for oral hygiene to adhere to c spine precautions, pt receptive)  UE Dressing Assistance:  (SBA first trial, Mod I second trial, pt initally requires cues for compensatory techniques to adhere to cervical spinal percautions while donning/doffing pullover garment " to ensure <90 degrees shoulder flexion)  LE Dressing Assistance: Independent  LE Dressing Deficit:  (to don/doff B socks/shoes, undergarment and shorts)  Activities of Daily Living:      UE Dressing  UE Dressing Level of Assistance: Close supervision, Modified independent  UE Dressing Where Assessed: Chair  UE Dressing Comments: education on compensatory techniques to adhere to cervical spinal precautions, initially cues to adhere but progressed to Mod I    LE Dressing  LE Dressing: Yes  Pants Level of Assistance: Independent  Sock Level of Assistance: Independent  Shoe Level of Assistance: Independent  LE Dressing Where Assessed: Chair       Activity Tolerance:  Endurance: Endurance does not limit participation in activity       Bed Mobility/Transfers: Bed Mobility  Bed Mobility: Yes  Bed Mobility 1  Bed Mobility 1: Supine to sitting, Sitting to supine, Log roll  Level of Assistance 1: Modified independent  Bed Mobility Comments 1: s/p education on log roll technique    Transfers  Transfer: Yes  Transfer 1  Technique 1: Sit to stand, Stand to sit  Transfer Level of Assistance 1: Modified independent      Functional Mobility:  Functional Mobility  Functional Mobility Performed: Yes  Functional Mobility 1  Comments 1: Pt functionally navigated x min household distance using no AD independently using no AD. No LOB noted.  Sitting Balance:  Static Sitting Balance  Static Sitting-Balance Support: Bilateral upper extremity supported, Feet supported  Static Sitting-Level of Assistance: Independent  Static Sitting-Comment/Number of Minutes: EOB  Dynamic Sitting Balance  Dynamic Sitting-Comments: Independent  Standing Balance:  Static Standing Balance  Static Standing-Balance Support: No upper extremity supported  Static Standing-Level of Assistance: Independent  Dynamic Standing Balance  Dynamic Standing-Comments: Independent      Sensation:  Sensation Comment: pt reports numbness in B hands at  baseline  Strength:  Strength Comments: not formally assessed d/t cervical spinal precautions; observed to be WFL based on function    Coordination:  Movements are Fluid and Coordinated: Yes        Extremities: RUE   RUE : Exceptions to WFL (shoulder flexion assessed to 90 degrees d/t cervical spinal precautions, distally WFL) and LUE   LUE: Exceptions to WFL (shoulder flexion assessed to 90 degrees d/t cervical spinal precautions, distally WFL)      Outcome Measures: [facility] Daily Activity  Putting on and taking off regular lower body clothing: None  Bathing (including washing, rinsing, drying): None  Putting on and taking off regular upper body clothing: None  Toileting, which includes using toilet, bedpan or urinal: None  Taking care of personal grooming such as brushing teeth: None  Eating Meals: None  Daily Activity - Total Score: 24        Education Documentation  Handouts, taught by Saskia Smith OT at 8/13/2024  2:41 PM.  Learner: Patient  Readiness: Acceptance  Method: Explanation, Demonstration, Handout  Response: Verbalizes Understanding    Body Mechanics, taught by Saskia Smith OT at 8/13/2024  2:41 PM.  Learner: Patient  Readiness: Acceptance  Method: Explanation, Demonstration, Handout  Response: Verbalizes Understanding    Precautions, taught by Saskia Smith OT at 8/13/2024  2:41 PM.  Learner: Patient  Readiness: Acceptance  Method: Explanation, Demonstration, Handout  Response: Verbalizes Understanding    ADL Training, taught by Saskia Smith OT at 8/13/2024  2:41 PM.  Learner: Patient  Readiness: Acceptance  Method: Explanation, Demonstration, Handout  Response: Verbalizes Understanding    Education Comments  No comments found.

## 2024-08-13 NOTE — PROGRESS NOTES
Pharmacy Medication History Review   Spoke to the patient.    Franki Peterson is a 54 y.o. male admitted for Other spondylosis with myelopathy, cervical region. Pharmacy reviewed the patient's wpnhh-[id number] medications and allergies for accuracy.    The list below reflectives the updated PTA list. Please review each medication in order reconciliation for additional clarification and justification.  Prior to Admission Medications   Prescriptions Last Dose Informant   CALCIUM CITRATE-VITAMIN D3 ORAL 8/12/2024    Sig: Take by mouth.   EPINEPHrine 0.3 mg/0.3 mL injection syringe     Sig: ADMINISTER 1 DOSE PRN UTD   LORazepam (Ativan) 1 mg tablet 8/12/2024    Sig: Take by mouth. Last and only fill was 5/24/23 for #30   escitalopram (Lexapro) 5 mg tablet 8/12/2024    Sig: Take 1 tablet (5 mg) by mouth once daily.   fexofenadine (Allegra) 180 mg tablet 8/12/2024    Sig: Take 1 tablet (180 mg) by mouth.   hydroCHLOROthiazide (HYDRODiuril) 25 mg tablet 8/12/2024    Sig: Take 1 tablet (25 mg) by mouth once daily.   losartan (Cozaar) 50 mg tablet 8/12/2024    Sig: Take 1 tablet (50 mg) by mouth once daily.   omega 3-dha-epa-fish oil (Fish OiL) 1,000 mg (120 mg-180 mg) capsule 8/12/2024    Sig: Take 1 capsule (1,000 mg) by mouth once daily.   omeprazole (PriLOSEC) 40 mg DR capsule 8/12/2024    Sig: Take 1 capsule (40 mg) by mouth once daily.   rosuvastatin (Crestor) 20 mg tablet 8/12/2024    Sig: Take 1 tablet (20 mg) by mouth once daily.   Patient taking differently: Take 1 tablet (20 mg) by mouth once daily. Has current Rx      Facility-Administered Medications: None       The list below reflectives the updated allergy list. Please review each documented allergy for additional clarification and justification.  Allergies  Reviewed by Lizz Nicole RN on 8/12/2024        Severity Reactions Comments    Lisinopril Not Specified Angioedema     Penicillins Low Rash             Below are additional concerns with the patient's PTA  list.      Liza Graham, MAThT

## 2024-08-13 NOTE — PROGRESS NOTES
Physical Therapy    Physical Therapy Evaluation & Treatment    Patient Name: Franki Peterson  MRN: [id number]  Today's Date: 8/13/2024   Time Calculation  Start Time: 0938  Stop Time: 1001  Time Calculation (min): 23 min    Assessment/Plan   PT Assessment  Rehab Prognosis: Excellent  Evaluation/Treatment Tolerance: Patient tolerated treatment well  Medical Staff Made Aware: Yes  End of Session Communication: Bedside nurse  Assessment Comment: Pt s/p cervical surgery however now demonstrating mod independence with bed mobility, transfers, gait and stairs and denies need for PT services in acute as well as home setting. No concerns from PT perspective - Evaluation only completed with orders discharged. Please reorder if change in pt status noted  End of Session Patient Position: Up in chair, Alarm off, not on at start of session   IP OR SWING BED PT PLAN  Inpatient or Swing Bed: Inpatient  PT Plan  PT Plan: PT Eval only  PT Eval Only Reason: No acute PT needs identified  PT Frequency: PT eval only  PT Discharge Recommendations: No PT needed after discharge, No further acute PT  Equipment Recommended upon Discharge:  (no needs anticipated)  PT Recommended Transfer Status: Independent  PT - OK to Discharge: Yes      Subjective     General Visit Information:  General  Reason for Referral: Pt is a 55 yo male presenting POD #1 s/p posterior C3-4 lami and decompression as well as C3-5 fusion.  Referred By: Dr. Hamm  Past Medical History Relevant to Rehab:   Past Medical History:   Diagnosis Date    Anxiety     GERD (gastroesophageal reflux disease)     Hyperlipidemia     Hypertension     Impaired fasting glucose 10/15/2015    Impaired fasting glucose    Prostate cancer (Multi)        Family/Caregiver Present: Yes  Caregiver Feedback: pt's brother initially present and receptive  Prior to Session Communication: Bedside nurse  Patient Position Received: Bed, 3 rail up, Alarm on  General Comment: Pt pleasant and agreeable to PT  "evaluation  Home Living:  Home Living  Type of Home: House  Lives With: Spouse (adult son (goes to college in 1 week))  Home Adaptive Equipment: None  Home Layout: Two level, Stairs to alternate level with rails (does not need to access basement)  Alternate Level Stairs-Rails: Left  Alternate Level Stairs-Number of Steps: 14  Home Access: Stairs to enter without rails  Entrance Stairs-Rails: None  Entrance Stairs-Number of Steps: 2  Bathroom Shower/Tub:  (shower on 2nd floor)  Bathroom Toilet: Standard  Bathroom Equipment: None  Prior Level of Function:  Prior Function Per Pt/Caregiver Report  Level of [place]: Independent with ADLs and functional transfers, Independent with homemaking with ambulation  ADL Assistance: Independent  Homemaking Assistance: Independent (Pt reports he does most cooking and laundry and splits cleaning duties)  Ambulatory Assistance: Independent (no device in community)  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 No Falls  Precautions:  Precautions  Medical Precautions: Fall precautions  Post-Surgical Precautions: Spinal precautions (cervical)  Braces Applied: Soft C-Collar  Vital Signs:       Objective   Pain:  Pain Assessment  Pain Assessment: 0-10  0-10 (Numeric) Pain Score: 5 - Moderate pain  Pain Type: Surgical pain  Pain Location: Shoulder (and neck)  Pain Orientation: Left, Right  Pain Interventions: Ambulation/increased activity  Cognition:  Cognition  Overall Cognitive Status: Within Functional Limits  Orientation Level: Oriented X4  Attention: Within Functional Limits  Memory: Within Funtional Limits  Insight: Within function limits  Impulsive: Within functional limits    General Assessments:  Activity Tolerance  Endurance: Endurance does not limit participation in activity         Coordination  Movements are Fluid and Coordinated: Yes    Static Sitting Balance  Static Sitting-Balance Support: No upper extremity supported, Feet " "supported  Static Sitting-Level of Assistance: Independent    Static Standing Balance  Static Standing-Balance Support: No upper extremity supported  Static Standing-Level of Assistance: Independent  Functional Assessments:  Bed Mobility  Bed Mobility: Yes  Bed Mobility 1  Bed Mobility 1: Supine to sitting  Level of Assistance 1: Distant supervision, Modified independent  Bed Mobility Comments 1: Initial VCs for log roll sequencing - SUP (emerging to Mod Ind after initial training)    Transfers  Transfer: Yes  Transfer 1  Transfer From 1: Sit to  Transfer to 1: Stand  Technique 1: Sit to stand, Stand to sit  Transfer Level of Assistance 1: Modified independent    Ambulation/Gait Training  Ambulation/Gait Training Performed: Yes  Ambulation/Gait Training 1  Surface 1: Level tile  Device 1: No device  Gait Support Devices: Gait belt  Assistance 1: Modified independent  Quality of Gait 1:  (minimally reduced gait leatha)  Comments/Distance (ft) 1: 150ft 2x  Extremity/Trunk Assessments:  RLE   RLE : Within Functional Limits  LLE   LLE : Within Functional Limits  Treatments:  Ambulation/Gait Training  Ambulation/Gait Training Performed: Yes  Ambulation/Gait Training 1  Surface 1: Level tile  Device 1: No device  Gait Support Devices: Gait belt  Assistance 1: Modified independent  Quality of Gait 1:  (minimally reduced gait leatha)  Comments/Distance (ft) 1: 150ft 2x (2nd trial completed part of treatment session)  Stairs  Stairs: Yes  Stairs  Rails 1: Bilateral, None (Comment)  Device 1: No device  Support Devices 1: Gait belt  Assistance 1: Modified independent  Comment/Number of Steps 1: Initially completed x4 6\" steps using BHRs and then progressed to 2nd trial with no HRs. Reciprocal pattern utilized  Outcome Measures:  [facility] Basic Mobility  Turning from your back to your side while in a flat bed without using bedrails: None  Moving from lying on your back to sitting on the side of a flat bed without using " bedrails: None  Moving to and from bed to chair (including a wheelchair): None  Standing up from a chair using your arms (e.g. wheelchair or bedside chair): None  To walk in hospital room: None  Climbing 3-5 steps with railing: None  Basic Mobility - Total Score: 24    Encounter Problems       Encounter Problems (Active)       Pain - Adult              Education Documentation  Precautions, taught by Diego Betts, PT at 8/13/2024 11:22 AM.  Learner: Family, Patient  Readiness: Acceptance  Method: Explanation, Demonstration, Handout  Response: Verbalizes Understanding    Body Mechanics, taught by Diego Betts, PT at 8/13/2024 11:22 AM.  Learner: Family, Patient  Readiness: Acceptance  Method: Explanation, Demonstration, Handout  Response: Verbalizes Understanding    Mobility Training, taught by Diego Betts, PT at 8/13/2024 11:22 AM.  Learner: Family, Patient  Readiness: Acceptance  Method: Explanation, Demonstration, Handout  Response: Verbalizes Understanding    Education Comments  No comments found.

## 2024-08-13 NOTE — CARE PLAN
The patient's goals for the shift include remain comfortable    The clinical goals for the shift include pain contol    Over the shift, the patient did not make progress toward the following goals. Barriers to progression include post surgical. Recommendations to address these barriers include pain management.

## 2024-08-14 ENCOUNTER — PHARMACY VISIT (OUTPATIENT)
Dept: PHARMACY | Facility: CLINIC | Age: 54
End: 2024-08-14
Payer: MEDICARE

## 2024-08-14 VITALS
SYSTOLIC BLOOD PRESSURE: 143 MMHG | TEMPERATURE: 97.8 F | BODY MASS INDEX: 28.52 KG/M2 | WEIGHT: 210.54 LBS | OXYGEN SATURATION: 98 % | RESPIRATION RATE: 17 BRPM | HEART RATE: 95 BPM | HEIGHT: 72 IN | DIASTOLIC BLOOD PRESSURE: 91 MMHG

## 2024-08-14 PROCEDURE — 2500000004 HC RX 250 GENERAL PHARMACY W/ HCPCS (ALT 636 FOR OP/ED): Performed by: ORTHOPAEDIC SURGERY

## 2024-08-14 PROCEDURE — RXMED WILLOW AMBULATORY MEDICATION CHARGE

## 2024-08-14 PROCEDURE — 2500000001 HC RX 250 WO HCPCS SELF ADMINISTERED DRUGS (ALT 637 FOR MEDICARE OP): Performed by: ORTHOPAEDIC SURGERY

## 2024-08-14 PROCEDURE — 2500000002 HC RX 250 W HCPCS SELF ADMINISTERED DRUGS (ALT 637 FOR MEDICARE OP, ALT 636 FOR OP/ED): Performed by: ORTHOPAEDIC SURGERY

## 2024-08-14 RX ORDER — HYDROCHLOROTHIAZIDE 25 MG/1
25 TABLET ORAL DAILY
Status: DISCONTINUED | OUTPATIENT
Start: 2024-08-14 | End: 2024-08-14 | Stop reason: HOSPADM

## 2024-08-14 RX ORDER — ROSUVASTATIN CALCIUM 20 MG/1
20 TABLET, COATED ORAL DAILY
Status: DISCONTINUED | OUTPATIENT
Start: 2024-08-14 | End: 2024-08-14 | Stop reason: HOSPADM

## 2024-08-14 RX ORDER — LOSARTAN POTASSIUM 50 MG/1
50 TABLET ORAL DAILY
Status: DISCONTINUED | OUTPATIENT
Start: 2024-08-14 | End: 2024-08-14 | Stop reason: HOSPADM

## 2024-08-14 RX ORDER — ROSUVASTATIN CALCIUM 20 MG/1
20 TABLET, COATED ORAL NIGHTLY
Status: DISCONTINUED | OUTPATIENT
Start: 2024-08-14 | End: 2024-08-14

## 2024-08-14 RX ORDER — ESCITALOPRAM OXALATE 10 MG/1
5 TABLET ORAL DAILY
Status: DISCONTINUED | OUTPATIENT
Start: 2024-08-14 | End: 2024-08-14 | Stop reason: HOSPADM

## 2024-08-14 RX ORDER — METHOCARBAMOL 500 MG/1
500 TABLET, FILM COATED ORAL 3 TIMES DAILY PRN
Qty: 25 TABLET | Refills: 1 | Status: SHIPPED | OUTPATIENT
Start: 2024-08-14

## 2024-08-14 RX ORDER — OXYCODONE HYDROCHLORIDE 5 MG/1
5 TABLET ORAL EVERY 6 HOURS PRN
Qty: 25 TABLET | Refills: 0 | Status: SHIPPED | OUTPATIENT
Start: 2024-08-14

## 2024-08-14 ASSESSMENT — PAIN SCALES - GENERAL
PAINLEVEL_OUTOF10: 7
PAINLEVEL_OUTOF10: 3

## 2024-08-14 ASSESSMENT — COGNITIVE AND FUNCTIONAL STATUS - GENERAL
DAILY ACTIVITIY SCORE: 24
MOBILITY SCORE: 24

## 2024-08-14 NOTE — DISCHARGE INSTRUCTIONS
Wear collar for 10 days  May shower on Sunday 8/19  Call Dr. Hamm with any concerns [phone]  Dr. Hamm's office to call to confirm follow-up

## 2024-08-14 NOTE — DISCHARGE SUMMARY
Discharge Diagnosis  Other spondylosis with myelopathy, cervical region    Issues Requiring Follow-Up       Test Results Pending At Discharge  Pending Labs       No current pending labs.            Hospital Course   This 54-year-old man has developed the abrupt onset of severe cervical myelopathy with bilateral arm weakness and gait disturbance.  He has had a remote anterior cervical decompression and fusion that he did extremely well with.  Urgent imaging revealed severe spinal cord compression at C3-4.  Prompt evaluation and urgent surgical scheduling was arranged.  On the day of admission he underwent a posterior cervical decompression and fusion C3-5.  Postoperatively he did well with improvement in his radicular symptoms.  He was neurologically intact.  He was ambulating.  On postoperative day 2 his drain was removed.  His incision was clean and dry.    He was cleared by physical therapy for discharge home.    ** Dictated with voice recognition software and not immediately reviewed for errors in grammar and/or spelling **    Pertinent Physical Exam At Time of Discharge  Physical Exam    Home Medications     Medication List      START taking these medications     methocarbamol 500 mg tablet; Commonly known as: Robaxin; Take 1 tablet   (500 mg) by mouth 3 times a day as needed for muscle spasms.   oxyCODONE 5 mg immediate release tablet; Commonly known as: Roxicodone;   Take 1 tablet (5 mg) by mouth every 6 hours if needed for moderate pain (4   - 6).     CHANGE how you take these medications     rosuvastatin 20 mg tablet; Commonly known as: Crestor; Take 1 tablet (20   mg) by mouth once daily.; What changed: additional instructions     CONTINUE taking these medications     EPINEPHrine 0.3 mg/0.3 mL injection syringe; Commonly known as: Epipen   escitalopram 5 mg tablet; Commonly known as: Lexapro   fexofenadine 180 mg tablet; Commonly known as: Allegra   hydroCHLOROthiazide 25 mg tablet; Commonly known as:  HYDRODiuril   LORazepam 1 mg tablet; Commonly known as: Ativan   losartan 50 mg tablet; Commonly known as: Cozaar   omeprazole 40 mg DR capsule; Commonly known as: PriLOSEC     STOP taking these medications     CALCIUM CITRATE-VITAMIN D3 ORAL   Fish OiL 1,000 mg (120 mg-180 mg) capsule; Generic drug: omega   3-dha-epa-fish oil       Outpatient Follow-Up  Future Appointments   Date Time Provider Department Center   9/9/2024 11:20 AM Sunil Hamm MD [id number]       Sunil Hamm MD

## 2024-08-14 NOTE — NURSING NOTE
Interdisciplinary team present: NP, PT, NM, CC, SW, Orthopedic Coordinator, and bedside RN.  Pain - controlled  Nausea - none  Discharge barrier - no barrier, drains removed  Discharge plan - Home   Discharge date/time - today

## 2024-09-09 ENCOUNTER — APPOINTMENT (OUTPATIENT)
Dept: ORTHOPEDIC SURGERY | Facility: CLINIC | Age: 54
End: 2024-09-09
Payer: COMMERCIAL

## 2024-09-09 ENCOUNTER — HOSPITAL ENCOUNTER (OUTPATIENT)
Dept: RADIOLOGY | Facility: CLINIC | Age: 54
Discharge: HOME | End: 2024-09-09
Payer: COMMERCIAL

## 2024-09-09 DIAGNOSIS — M47.12 CERVICAL SPONDYLOSIS WITH MYELOPATHY: ICD-10-CM

## 2024-09-09 PROCEDURE — 72040 X-RAY EXAM NECK SPINE 2-3 VW: CPT | Performed by: RADIOLOGY

## 2024-09-09 PROCEDURE — 99024 POSTOP FOLLOW-UP VISIT: CPT | Performed by: ORTHOPAEDIC SURGERY

## 2024-09-09 PROCEDURE — 72040 X-RAY EXAM NECK SPINE 2-3 VW: CPT

## 2024-09-09 NOTE — LETTER
September 9, 2024     Raymond Villanueva MD  [address] [facility] [address]    Patient: Franki Peterson   YOB: 1970   Date of Visit: 9/9/2024       Dear Dr. Raymond Villanueva MD:    Thank you for referring Franki Peterson to me for evaluation. Below are my notes for this consultation.  If you have questions, please do not hesitate to call me. I look forward to following your patient along with you.       Sincerely,     Sunil Hamm MD      CC: No Recipients  ______________________________________________________________________________________    Ry returns for his first postop visit and he is doing well.  He does note improvement in his preoperative myelo radicular symptoms.    Incision healed.  Strength back.    X-rays show good position of his instrumentation.    Stable course.  He is going to continue a home exercise program.  He is off of work for an additional 4 weeks.  We will see him back in 4 weeks with plain film and likely return to work at that point.

## 2024-09-09 NOTE — PROGRESS NOTES
Ry returns for his first postop visit and he is doing well.  He does note improvement in his preoperative myelo radicular symptoms.    Incision healed.  Strength back.    X-rays show good position of his instrumentation.    Stable course.  He is going to continue a home exercise program.  He is off of work for an additional 4 weeks.  We will see him back in 4 weeks with plain film and likely return to work at that point.

## 2024-10-02 ENCOUNTER — APPOINTMENT (OUTPATIENT)
Dept: ORTHOPEDIC SURGERY | Facility: CLINIC | Age: 54
End: 2024-10-02
Payer: COMMERCIAL

## 2024-10-02 ENCOUNTER — HOSPITAL ENCOUNTER (OUTPATIENT)
Dept: RADIOLOGY | Facility: CLINIC | Age: 54
Discharge: HOME | End: 2024-10-02
Payer: COMMERCIAL

## 2024-10-02 DIAGNOSIS — M47.12 CERVICAL SPONDYLOSIS WITH MYELOPATHY: ICD-10-CM

## 2024-10-02 PROCEDURE — 72040 X-RAY EXAM NECK SPINE 2-3 VW: CPT

## 2024-10-02 PROCEDURE — 1036F TOBACCO NON-USER: CPT | Performed by: ORTHOPAEDIC SURGERY

## 2024-10-02 PROCEDURE — 72040 X-RAY EXAM NECK SPINE 2-3 VW: CPT | Performed by: RADIOLOGY

## 2024-10-02 PROCEDURE — 99024 POSTOP FOLLOW-UP VISIT: CPT | Performed by: ORTHOPAEDIC SURGERY

## 2024-10-02 ASSESSMENT — PAIN - FUNCTIONAL ASSESSMENT: PAIN_FUNCTIONAL_ASSESSMENT: NO/DENIES PAIN

## 2024-10-02 NOTE — PROGRESS NOTES
Ry returns 2 months from surgery and he continues to improve.  Excellent relief of his severe preoperative radicular symptoms.    He looks quite good.  His strength is intact.    Stable course.  He will continue an exercise program.  I think he can return to full duty at work.    He will keep us updated on his progress and follow-up as needed.    ** Dictated with voice recognition software and not immediately reviewed for errors in grammar and/or spelling **

## 2024-10-02 NOTE — LETTER
October 2, 2024     Raymond Villanueva MD  [address] [facility] [address]    Patient: Franki Peterson   YOB: 1970   Date of Visit: 10/2/2024       Dear Dr. Raymond Villanueva MD:    Thank you for referring Franki Peterson to me for evaluation. Below are my notes for this consultation.  If you have questions, please do not hesitate to call me. I look forward to following your patient along with you.       Sincerely,     Sunil Hamm MD      CC: No Recipients  ______________________________________________________________________________________    Ry returns 2 months from surgery and he continues to improve.  Excellent relief of his severe preoperative radicular symptoms.    He looks quite good.  His strength is intact.    Stable course.  He will continue an exercise program.  I think he can return to full duty at work.    He will keep us updated on his progress and follow-up as needed.    ** Dictated with voice recognition software and not immediately reviewed for errors in grammar and/or spelling **

## 2024-12-18 DIAGNOSIS — S39.012A LUMBAR STRAIN, INITIAL ENCOUNTER: Primary | ICD-10-CM

## 2025-02-18 ENCOUNTER — APPOINTMENT (OUTPATIENT)
Dept: PHYSICAL THERAPY | Facility: CLINIC | Age: 55
End: 2025-02-18
Payer: COMMERCIAL

## 2025-02-25 ENCOUNTER — EVALUATION (OUTPATIENT)
Dept: PHYSICAL THERAPY | Facility: CLINIC | Age: 55
End: 2025-02-25
Payer: COMMERCIAL

## 2025-02-25 DIAGNOSIS — S39.012S LUMBAR SPINE STRAIN, SEQUELA: Primary | ICD-10-CM

## 2025-02-25 DIAGNOSIS — S39.012A LUMBAR STRAIN, INITIAL ENCOUNTER: ICD-10-CM

## 2025-02-25 PROCEDURE — 97110 THERAPEUTIC EXERCISES: CPT | Mod: GP | Performed by: PHYSICAL THERAPIST

## 2025-02-25 ASSESSMENT — ENCOUNTER SYMPTOMS
LOSS OF SENSATION IN FEET: 0
OCCASIONAL FEELINGS OF UNSTEADINESS: 0
DEPRESSION: 0

## 2025-02-25 NOTE — PROGRESS NOTES
Physical Therapy Evaluation    Patient Name: Franki Peterson  MRN: [id number]  Today's Date: 2/25/2025  Visit: 1  Referred by: Pau Bowling PA-C  Time in:   4:15       Time out: 4:55  Diagnosis:   1. Lumbar spine strain, sequela        PRECAUTIONS:   S/p C3-5 decompression and fusion 8-12-24    SUBJECTIVE:  54 y.o. english speaking male with c/o chronic intermittent low back pain x 1 year.   Insidious onset.  Central to right lower lumbar pain.  Worse with prolonged sitting and standing.  Better on the move.  Pain:  0- 8/10  Home Living:   and lives in their home.  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  Personal factors that may impact care:  none  OBJECTIVE:  5/5 lumbar LE myotomes  Tight hams  Mild ROT tightness with hips  Slouched sitting posture  No TTP of LS spine and soft tissues.  Repeated movement testing of spine:   FIS- pain produced R) LS spine, Worse w/reps, abolished shortly after stopping.   EIS- no pain, (-) reps  R) SG- ERP, mildly worse with reps, abolished shortly after stopping.  L)  SG- NE, (-) reps  NANCY- NE, (-) reps  EIL- NE, (-) reps  4/5 abdominals and back extensors    Outcome Measure:  Oswestry- 14%    ASSESSMENT:  Pt. With intermittent LBP, with poor sitting postural habit, core muscle weakness,   He requires PT to address these issues.  He tolerated today's session well.    Problem list:   See above    Low complexity due to patient's clinical presentation being stable and uncomplicated by any significant comorbidities that may affect rehab tolerance and progression.     Clinical presentation:  Stable and/or uncomplicated characteristics,     TREATMENT:  - Therex:  REIL  3x10  EDWIGE  3x10    PATIENT EDUCATION:  HEP, instruction on and practice of neutral spine sitting.    PLAN:   Daily HEP, EIL prior to rising in AM.  EIS 4-5 sessions per day.  Strict sitting postural correction.  Weekly PT x 8 weeks to progress towards PT goals.  Rehab potential:  Good  Plan of care agreement: Y    GOALS:  Active       PT Problem       Pt will have improved pain free ROM of trunk       Start:  02/25/25    Expected End:  05/02/25            Pt will have improved core muscle strength       Start:  02/25/25    Expected End:  05/02/25            Pt will have improved Oswestry score by at least 10%       Start:  02/25/25    Expected End:  05/02/25            Pt will be independent with HEP       Start:  02/25/25    Expected End:  04/18/25            Pt will have improved reports of pain by at least 2 levels       Start:  02/25/25    Expected End:  04/18/25

## 2025-03-04 ENCOUNTER — APPOINTMENT (OUTPATIENT)
Dept: PHYSICAL THERAPY | Facility: CLINIC | Age: 55
End: 2025-03-04
Payer: COMMERCIAL

## 2025-03-07 ENCOUNTER — TREATMENT (OUTPATIENT)
Dept: PHYSICAL THERAPY | Facility: CLINIC | Age: 55
End: 2025-03-07
Payer: COMMERCIAL

## 2025-03-07 DIAGNOSIS — S39.012S LUMBAR SPINE STRAIN, SEQUELA: Primary | ICD-10-CM

## 2025-03-07 PROCEDURE — 97110 THERAPEUTIC EXERCISES: CPT | Mod: GP | Performed by: PHYSICAL THERAPIST

## 2025-03-07 NOTE — PROGRESS NOTES
Physical Therapy Evaluation    Patient Name: Franki Peterson  MRN: [id number]  Today's Date: 3/7/2025  Visit: 2  Referred by: Pau Bowling PA-C  Time in:   11:30       Time out: 12:10  Diagnosis:   1. Lumbar spine strain, sequela        PRECAUTIONS:   S/p C3-5 decompression and fusion 8-12-24    SUBJECTIVE:  54 y.o. male with  Hx of chronic intermittent low back pain x 1 year.  He returns for F/U for intermittent R) LBP and R) thigh pain.     Pain:  0- 8/10    OBJECTIVE:    Repeated movement testing of spine:   FIS- pain produced R) LS spine, same w/reps, mildly worse after stopping.   EIS- no pain, (-) reps  R) SG- ERP, same to better worse with reps, abolished after stopping.  L)  SG- NE, (-) reps  NANCY- NE, (-) reps  EIL- NE, (-) reps      Outcome Measure:  Oswestry- 14%    ASSESSMENT:  Pt with intermittent R) LBP and R) thigh pain.  Pain with R) SG/Sb improved after manual therapy and modified ex's to increased lateral forces.    TREATMENT:  - Therex:  REIL  3x10  EDWIGE  3x10  R) L4-5 and L5-S1 SB mobilization  REIL in R) RK  3x10  Wall R) SG  4x10  EIS x 10    PLAN:   Daily HEP: EIL in R) RK prior to rising in AM.  R) wall SG and EIS 4-5 sessions per day.  Strict sitting postural correction.  F/U in 2 weeks and reassess and progress as needed.    GOALS:  Active       PT Problem       Pt will have improved pain free ROM of trunk       Start:  02/25/25    Expected End:  05/02/25            Pt will have improved core muscle strength       Start:  02/25/25    Expected End:  05/02/25            Pt will have improved Oswestry score by at least 10%       Start:  02/25/25    Expected End:  05/02/25            Pt will be independent with HEP       Start:  02/25/25    Expected End:  04/18/25            Pt will have improved reports of pain by at least 2 levels       Start:  02/25/25    Expected End:  04/18/25

## 2025-03-31 ENCOUNTER — TREATMENT (OUTPATIENT)
Dept: PHYSICAL THERAPY | Facility: CLINIC | Age: 55
End: 2025-03-31
Payer: COMMERCIAL

## 2025-03-31 DIAGNOSIS — S39.012S LUMBAR SPINE STRAIN, SEQUELA: ICD-10-CM

## 2025-03-31 PROCEDURE — 97110 THERAPEUTIC EXERCISES: CPT | Mod: GP | Performed by: PHYSICAL THERAPIST

## 2025-03-31 NOTE — PROGRESS NOTES
Physical Therapy Treatment    Patient Name: Franki Peterson  MRN: [id number]  Today's Date: 3/31/2025  Visit: 3  Referred by: Pua Bowling PA-C  Time in:   3:30       Time out: 4:10  Diagnosis:   1. Lumbar spine strain, sequela  Follow Up In Physical Therapy      PRECAUTIONS:   S/p C3-5 decompression and fusion 8-12-24    SUBJECTIVE:  54 y.o. male with  Hx of chronic intermittent R) LBP and R) thigh pain.   He reports today that he has had symptoms very infrequently.  Can go a whole day without symptoms sometimes.  Pain:  0- 2/10    OBJECTIVE:    Repeated movement testing of spine:   FIS- 3x10 without symptoms and good EIS ROM after   EIS- no pain, (-) reps  R) SG- ERP, same to better worse with reps, abolished after stopping.  L)  SG- NE, (-) reps  NANCY- NE, (-) reps  EIL- NE, (-) reps      Outcome Measure:  Oswestry- 14%    ASSESSMENT:  Pt who returns stating his symptoms are markedly improved.  Rarely feels anything.  No AM pain or pain with getting in and out of his vehicle.  No symptoms produced today.    TREATMENT:  - Therex:  REIL  3x10  EDWIGE  3x10  REIL 2x10  R) SG  2x10  EIS x 10    PLAN:   Continue R) SG and EIS with strict sitting postural correction.  F/U in 2 weeks with probable discharge.    GOALS:  Active       PT Problem       Pt will have improved pain free ROM of trunk       Start:  02/25/25    Expected End:  05/02/25            Pt will have improved core muscle strength       Start:  02/25/25    Expected End:  05/02/25            Pt will have improved Oswestry score by at least 10%       Start:  02/25/25    Expected End:  05/02/25            Pt will be independent with HEP       Start:  02/25/25    Expected End:  04/18/25            Pt will have improved reports of pain by at least 2 levels       Start:  02/25/25    Expected End:  04/18/25

## 2025-04-28 ENCOUNTER — TREATMENT (OUTPATIENT)
Dept: PHYSICAL THERAPY | Facility: CLINIC | Age: 55
End: 2025-04-28
Payer: COMMERCIAL

## 2025-04-28 DIAGNOSIS — S39.012S LUMBAR SPINE STRAIN, SEQUELA: ICD-10-CM

## 2025-04-28 PROCEDURE — 97140 MANUAL THERAPY 1/> REGIONS: CPT | Mod: GP | Performed by: PHYSICAL THERAPIST

## 2025-04-28 PROCEDURE — 97110 THERAPEUTIC EXERCISES: CPT | Mod: GP | Performed by: PHYSICAL THERAPIST

## 2025-04-28 NOTE — PROGRESS NOTES
"Physical Therapy Treatment    Patient Name: Franki Peterson  MRN: [id number]  Today's Date: 4/28/2025  Visit: 4  Referred by: Pau Bowling PA-C  Time in:   3:25       Time out: 4:05  Diagnosis:   1. Lumbar spine strain, sequela  Follow Up In Physical Therapy      PRECAUTIONS:   S/p C3-5 decompression and fusion 8-12-24    SUBJECTIVE:  54 y.o. male with  Hx of chronic intermittent R) LBP and R) thigh pain.   He states that ~3 days after last session that his pain got worse.  In that, he had slightly increased ache, but more frequent.  No sure why.  HEP performance inconsistent.  Pain:  0- 5/10    OBJECTIVE:  R) PSIS lower than left.  This increases with FIS.  After Repeated left SG this was improved.  After flexion in left SS this was improved more.  After manual therapy this was symmetrical.  Repeated movement testing of spine:   FIS- End range pain on R) LS junction, same with reps   EIS- NE   R) SG- ERP, same with reps   L) SG- NE   Flexion in L) SS- decreased ache   EXT in L) SS- NE      Outcome Measure:  Oswestry- 14%    ASSESSMENT:  Pt with worsening of symptoms not sure why.  He was better after sessions today.  No symptoms after session today.    TREATMENT:  - Therex:  R SG 2x10  Wall R) SG  10\" x 10  EIS 3x10  FIS 3x10  HL L-ROT L) Stretch  10\" x 10  EIL 3x10  Flex in L) SS 3x10  Ext in left SS 3x10    Manual therapy_ R) innominant ANT rotation mobilization.    PLAN:   Continue R) SG and EIS with strict sitting postural correction.  F/U whenever he can get back in and assess symptom response.    GOALS:  Active       PT Problem       Pt will have improved pain free ROM of trunk       Start:  02/25/25    Expected End:  05/02/25            Pt will have improved core muscle strength       Start:  02/25/25    Expected End:  05/02/25            Pt will have improved Oswestry score by at least 10%       Start:  02/25/25    Expected End:  05/02/25            Pt will be independent with HEP       Start:  02/25/25 "    Expected End:  04/18/25            Pt will have improved reports of pain by at least 2 levels       Start:  02/25/25    Expected End:  04/18/25

## 2025-05-29 ENCOUNTER — APPOINTMENT (OUTPATIENT)
Dept: PHYSICAL THERAPY | Facility: CLINIC | Age: 55
End: 2025-05-29
Payer: COMMERCIAL

## (undated) DEVICE — COLLAR, CERVICAL, UNIVERSAL, 3 IN

## (undated) DEVICE — SOLUTION, IRRIGATION, SODIUM CHLORIDE 0.9%, 1000 ML, POUR BOTTLE

## (undated) DEVICE — GLOVE, SURGICAL, PROTEXIS PI MICRO, 8.0, PF, LF

## (undated) DEVICE — CATHETER, IV, ANGIOCATH, 20 G X 1.16 IN, FEP POLYMER

## (undated) DEVICE — DRILL BIT, 2.4MM, SS

## (undated) DEVICE — SUTURE, ETHILON, 2-0, 18 IN, FS, BLACK, BX/36

## (undated) DEVICE — ELECTRODE, CORKSCREW NEEDLE 1.5M LENGTH

## (undated) DEVICE — STRIP, SKIN CLOSURE, STERI STRIP, REINFORCED, 0.5 X 4 IN

## (undated) DEVICE — LEGEND, BALL FLUTED, 9 CM, 3 MM

## (undated) DEVICE — PREP TRAY, VAGINAL

## (undated) DEVICE — DRAPE, INCISE, ANTIMICROBIAL, IOBAN 2, LARGE, 17 X 23 IN, DISPOSABLE, STERILE

## (undated) DEVICE — EVACUATOR, WOUND, CLOSED, 3 SPRING, 400 CC, Y CONNECTING TUBE

## (undated) DEVICE — Device

## (undated) DEVICE — SUTURE, VICRYL, 4-0, 18 IN, PS2, UNDYED

## (undated) DEVICE — TIP, SUCTION, FRAZIER, W/CONTROL VENT, 12 FR

## (undated) DEVICE — GLOVE, SURGICAL, PROTEXIS PI BLUE W/NEUTHERA, 8.5, PF, LF

## (undated) DEVICE — SUTURE, VICRYL, 2-0, 27 IN, FSL, UNDYED

## (undated) DEVICE — PIN, SKULL, MAYFIELD, ADULT

## (undated) DEVICE — NEEDLE, ELECTRODE, SUBDERMAL, PAIRED, 2.0 LEAD, DISP

## (undated) DEVICE — TOWEL, SURGICAL, NEURO, O/R, 16 X 26, BLUE, STERILE

## (undated) DEVICE — BONE, MILL, MIDAS REX, DUAL BLADE, ELECTRIC

## (undated) DEVICE — ELECTRODE, GROUND PLATE

## (undated) DEVICE — DRAIN, WOUND, ROUND, W/TROCAR, HOLE PATTERN, 10 IN, MEDIUM/LARGE, 3/16 X 49 IN

## (undated) DEVICE — SUTURE, VICRYL, 1, 27 IN, CT-1, VIOLET

## (undated) DEVICE — SUTURE, ETHILON, 2-0, 18 IN, FS, BLACK, BX/12

## (undated) DEVICE — DRESSING, GAUZE, SPONGE, KERLIX, SUPER, 6 X 6.75 IN, STERILE 10PK

## (undated) DEVICE — SYRINGE, 35 CC, LUER LOCK, MONOJECT, W/O CAP, LF

## (undated) DEVICE — ELECTRODE, ELECTROSURGICAL, BLADE, STANDARD, 2.75 IN